# Patient Record
Sex: MALE | Race: WHITE | Employment: OTHER | RURAL
[De-identification: names, ages, dates, MRNs, and addresses within clinical notes are randomized per-mention and may not be internally consistent; named-entity substitution may affect disease eponyms.]

---

## 2017-01-03 ENCOUNTER — TELEPHONE ANTICOAG (OUTPATIENT)
Dept: FAMILY MEDICINE CLINIC | Age: 72
End: 2017-01-03

## 2017-01-03 ENCOUNTER — CLINICAL SUPPORT (OUTPATIENT)
Dept: FAMILY MEDICINE CLINIC | Age: 72
End: 2017-01-03

## 2017-01-03 DIAGNOSIS — I48.91 ATRIAL FIBRILLATION, UNSPECIFIED TYPE (HCC): Primary | ICD-10-CM

## 2017-01-03 DIAGNOSIS — Z79.01 CURRENT USE OF LONG TERM ANTICOAGULATION: ICD-10-CM

## 2017-01-03 LAB
INR BLD: 2.6
PT POC: 30.9 SEC
VALID INTERNAL CONTROL?: YES

## 2017-01-03 NOTE — MR AVS SNAPSHOT
Visit Information Date & Time Provider Department Dept. Phone Encounter #  
 1/3/2017  7:30 AM [facility] [address] [facility] [phone] [id number] Your Appointments 2/3/2017  8:00 AM  
ESTABLISHED PATIENT with HARRIET Jaureguicharli Tucker ([address]) Appt Note: ov; [address] [address]  
3021 [facility] [address] [address] Upcoming Health Maintenance Date Due Hepatitis C Screening 1945 DTaP/Tdap/Td series (1 - Tdap) 4/4/1968 ZOSTER VACCINE AGE 60> 4/4/2007 GLAUCOMA SCREENING Q2Y 4/4/2012 Pneumococcal 65+ Low/Medium Risk (1 of 2 - PCV13) 4/4/2012 INFLUENZA AGE 9 TO ADULT 8/1/2016 FOBT Q 1 YEAR AGE 50-75 1/29/2017 MEDICARE YEARLY EXAM 1/29/2017 Allergies as of 1/3/2017  Review Complete On: 10/7/2016 By: Christine Lunsford RN Severity Noted Reaction Type Reactions Morphine  05/24/2013    Unknown (comments) Current Immunizations  Never Reviewed No immunizations on file. Not reviewed this visit You Were Diagnosed With   
  
 Codes Comments Atrial fibrillation, unspecified type ([facility]    -  Primary ICD-10-CM: I48.91 
ICD-9-CM: 427.31 Current use of long term anticoagulation     ICD-10-CM: Z79.01 
ICD-9-CM: V58.61 Vitals Smoking Status Former Smoker Preferred Pharmacy Pharmacy Name Phone [place] [phone] [facility] [address] [place] [address] [phone] Your Updated Medication List  
  
   
This list is accurate as of: 1/3/17  7:46 AM.  Always use your most recent med list.  
  
  
  
  
 aspirin 81 mg tablet Take 81 mg by mouth. atorvastatin 80 mg tablet Commonly known as:  LIPITOR  
TAKE 1 TABLET EVERY NIGHT  
  
 fosinopril 20 mg tablet Commonly known as:  MONOPRIL Take 20 mg by mouth daily. LIDODERM 5 % Generic drug:  lidocaine  
by TransDERmal route every twenty-four (24) hours. Apply patch to the affected area for 12 hours a day and remove for 12 hours a day. meloxicam 7.5 mg tablet Commonly known as:  MOBIC  
TAKE 1 TABLET EVERY DAY AS NEEDED FOR SEVERE JOINT PAIN  
  
 MSM PO Take  by mouth.  
  
 multivitamin tablet Commonly known as:  ONE A DAY Take 1 tablet by mouth daily. warfarin 5 mg tablet Commonly known as:  COUMADIN Take 1 Tab by mouth daily. Dx:  678.08 Introducing \[facility]\"" & [facility] SERVICES! Dear Melina Mccracken: Thank you for requesting a Footway account. Our records indicate that you already have an active Footway account. You can access your account anytime at [email or web address]. [email or web address] Did you know that you can access your hospital and ER discharge instructions at any time in Footway? You can also review all of your test results from your hospital stay or ER visit. Additional Information If you have questions, please visit the Frequently Asked Questions section of the Footway website at [email or web address]/. Remember, Footway is NOT to be used for urgent needs. For medical emergencies, dial 911. Now available from your iPhone and Android! Please provide this summary of care documentation to your next provider. Your primary care clinician is listed as Adore Gracia. If you have any questions after today's visit, please call [phone].

## 2017-02-03 ENCOUNTER — OFFICE VISIT (OUTPATIENT)
Dept: FAMILY MEDICINE CLINIC | Age: 72
End: 2017-02-03

## 2017-02-03 VITALS
HEIGHT: 71 IN | TEMPERATURE: 97.3 F | SYSTOLIC BLOOD PRESSURE: 138 MMHG | DIASTOLIC BLOOD PRESSURE: 78 MMHG | WEIGHT: 305 LBS | HEART RATE: 59 BPM | RESPIRATION RATE: 20 BRPM | BODY MASS INDEX: 42.7 KG/M2 | OXYGEN SATURATION: 97 %

## 2017-02-03 DIAGNOSIS — I10 ESSENTIAL HYPERTENSION: Primary | ICD-10-CM

## 2017-02-03 DIAGNOSIS — I25.10 CORONARY ARTERY DISEASE INVOLVING NATIVE CORONARY ARTERY OF NATIVE HEART WITHOUT ANGINA PECTORIS: ICD-10-CM

## 2017-02-03 DIAGNOSIS — M25.561 CHRONIC PAIN OF BOTH KNEES: ICD-10-CM

## 2017-02-03 DIAGNOSIS — Z79.01 CURRENT USE OF LONG TERM ANTICOAGULATION: ICD-10-CM

## 2017-02-03 DIAGNOSIS — G89.29 CHRONIC PAIN OF BOTH KNEES: ICD-10-CM

## 2017-02-03 DIAGNOSIS — Z00.00 HEALTH CARE MAINTENANCE: ICD-10-CM

## 2017-02-03 DIAGNOSIS — M25.562 CHRONIC PAIN OF BOTH KNEES: ICD-10-CM

## 2017-02-03 DIAGNOSIS — E78.5 HYPERLIPIDEMIA, UNSPECIFIED HYPERLIPIDEMIA TYPE: ICD-10-CM

## 2017-02-03 LAB
INR BLD: 2.4
PT POC: 26.4 SEC
VALID INTERNAL CONTROL?: YES

## 2017-02-03 NOTE — MR AVS SNAPSHOT
Visit Information Date & Time Provider Department Dept. Phone Encounter #  
 2/3/2017  8:00 AM Jose Schultz NP [facility] Maxi Neal [address] [facility] [phone] [id number] Your Appointments 3/3/2017  7:30 AM  
SURGERY with Jose Schultz NP  
[address] ([facility]) Appt Note: PT; Skin tags/needs P.T./INR  
 [address] [address]  
3027 [facility] [address] [address] Upcoming Health Maintenance Date Due Hepatitis C Screening 1945 GLAUCOMA SCREENING Q2Y 4/4/2012 MEDICARE YEARLY EXAM 1/29/2017 FOBT Q 1 YEAR AGE 50-75 1/29/2017 Pneumococcal 65+ Low/Medium Risk (2 of 2 - PPSV23) 2/3/2018 DTaP/Tdap/Td series (2 - Td) 2/3/2027 Allergies as of 2/3/2017  Review Complete On: 2/3/2017 By: Jose Schultz NP Severity Noted Reaction Type Reactions Morphine  05/24/2013    Unknown (comments) Current Immunizations  Never Reviewed No immunizations on file. Not reviewed this visit You Were Diagnosed With   
  
 Codes Comments Essential hypertension    -  Primary ICD-10-CM: I10 
ICD-9-CM: 401.9 Hyperlipidemia, unspecified hyperlipidemia type     ICD-10-CM: E78.5 ICD-9-CM: 272.4 Coronary artery disease involving native coronary artery of native heart without angina pectoris     ICD-10-CM: I25.10 ICD-9-CM: 414.01 Chronic pain of both knees     ICD-10-CM: M25.561, M25.562, G89.29 ICD-9-CM: 719.46, 338.29 Health care maintenance     ICD-10-CM: Z00.00 ICD-9-CM: V70.0 Current use of long term anticoagulation     ICD-10-CM: Z79.01 
ICD-9-CM: V58.61 Vitals BP Pulse Temp Resp Height(growth percentile) 142/70 (BP 1 Location: Left arm, BP Patient Position: Sitting) (!) 59 97.3 °F (36.3 °C) (Temporal) 20 5' 10.5\" (1.791 m) Weight(growth percentile) SpO2 BMI Smoking Status 305 lb (138.3 kg) 97% 43.14 kg/m2 Former Smoker Vitals History BMI and BSA Data Body Mass Index Body Surface Area  
 43.14 kg/m 2 2.62 m 2 Preferred Pharmacy Pharmacy Name Phone [place], [facility] [address] [phone] Your Updated Medication List  
  
   
This list is accurate as of: 2/3/17  9:32 AM.  Always use your most recent med list.  
  
  
  
  
 aspirin 81 mg tablet Take 81 mg by mouth. atorvastatin 80 mg tablet Commonly known as:  LIPITOR  
TAKE 1 TABLET EVERY NIGHT  
  
 fosinopril 20 mg tablet Commonly known as:  MONOPRIL Take 20 mg by mouth daily. LIDODERM 5 % Generic drug:  lidocaine  
by TransDERmal route every twenty-four (24) hours. Apply patch to the affected area for 12 hours a day and remove for 12 hours a day. meloxicam 7.5 mg tablet Commonly known as:  MOBIC  
TAKE 1 TABLET EVERY DAY AS NEEDED FOR SEVERE JOINT PAIN  
  
 MSM PO Take  by mouth.  
  
 multivitamin tablet Commonly known as:  ONE A DAY Take 1 tablet by mouth daily. warfarin 5 mg tablet Commonly known as:  COUMADIN Take 1 Tab by mouth daily. Dx:  711.06 We Performed the Following AMB POC PT/INR [25370 CPT(R)] CBC WITH AUTOMATED DIFF [17692 CPT(R)] COLLECTION CAPILLARY BLOOD SPECIMEN [81371 CPT(R)] HEPATITIS C AB [39214 CPT(R)] LIPID PANEL [26360 CPT(R)] METABOLIC PANEL, COMPREHENSIVE [45549 CPT(R)] REFERRAL TO ORTHOPEDICS [EEM054 Custom] Comments:  
 Please evaluate patient for bilateral knee Referral Information Referral ID Referred By Referred To  
  
 [id number] [place], 114 [facility] [address] [place] [address]  Phone: [phone] Fax: [phone] Visits Status Start Date End Date 1 Authorized 2/3/17 2/3/18  If your referral has a status of pending review or denied, additional information will be sent to support the outcome of this decision. Introducing [facility] & HEALTH SERVICES! Dear Ross Diaz: Thank you for requesting a OneAway account. Our records indicate that you already have an active OneAway account. You can access your account anytime at [email or web address] Did you know that you can access your hospital and ER discharge instructions at any time in OneAway? You can also review all of your test results from your hospital stay or ER visit. Additional Information If you have questions, please visit the Frequently Asked Questions section of the OneAway website at [email or web address] Remember, OneAway is NOT to be used for urgent needs. For medical emergencies, dial 911. Now available from your iPhone and Android! Please provide this summary of care documentation to your next provider. Your primary care clinician is listed as Mary Long. If you have any questions after today's visit, please call [phone].

## 2017-02-03 NOTE — PROGRESS NOTES
Subjective:     Trini Zazueta is a 73 y.o. male who presents today with the following:  Chief Complaint   Patient presents with    Annual Wellness Visit     subsequent   HPI: compliant with medications and current plan for chronic medical conditions. Doing well today. HTN: Denies chest pain, dyspnea, palpitations, HA, blurred vision. Hyperlipidemia: tolerating statin, diet control      Bilateral knee pain. Rt Knee surgery 20 years ago. \" It feels like I have bone grinding on bone. \"     Lt Knee painful and stiff when sitting for along time in a chair. \" I use this knee first going down the stairs because my other knee hurts too much. \"    Takes Tylenol as needed for the pain. ROS:  Gen: denies fever, chills, fatigue, weight loss, weight gain  HEENT:denies blurry vision, nasal congestion, sore throat  Resp: denies dypsnea, cough, wheezing  CV: denies chest pain radiating to the jaws or arms, palpitations, lower extremity edema  Abd: denies nausea, vomiting, diarrhea, constipation  Neuro: denies numbness/tingling  Endo: denies polyuria, polydipsia, heat/cold intolerance  Heme: no lymphadenopathy    Allergies   Allergen Reactions    Morphine Unknown (comments)         Current Outpatient Prescriptions:     atorvastatin (LIPITOR) 80 mg tablet, TAKE 1 TABLET EVERY NIGHT, Disp: 90 Tab, Rfl: 2    meloxicam (MOBIC) 7.5 mg tablet, TAKE 1 TABLET EVERY DAY AS NEEDED FOR SEVERE JOINT PAIN, Disp: 90 Tab, Rfl: 0    METHYLSULFONYLMETHANE (MSM PO), Take  by mouth., Disp: , Rfl:     warfarin (COUMADIN) 5 mg tablet, Take 1 Tab by mouth daily. Dx:  427.31 (Patient taking differently: Take 5 mg by mouth daily. Pt takes 1 tab Sat.,sun., tues. , and thurs. None on other days), Disp: 90 Tab, Rfl: 2    multivitamin (ONE A DAY) tablet, Take 1 tablet by mouth daily. , Disp: , Rfl:     lidocaine (LIDODERM) 5 %(700 mg/patch), by TransDERmal route every twenty-four (24) hours.  Apply patch to the affected area for 12 hours a day and remove for 12 hours a day., Disp: , Rfl:     aspirin 81 mg tablet, Take 81 mg by mouth., Disp: , Rfl:     fosinopril (MONOPRIL) 20 mg tablet, Take 20 mg by mouth daily. , Disp: , Rfl:     Past Medical History   Diagnosis Date    [facility]     Arthritis     Atrial fibrillation ([facility]     Congestive heart failure, unspecified      mild    Edema     Hypercholesterolemia     Hypertension     Unspecified vitamin D deficiency        Past Surgical History   Procedure Laterality Date    Hx knee arthroscopy Right     Hx angioplasty      Hx tonsillectomy         History   Smoking Status    Former Smoker   Smokeless Tobacco    Not on file       Social History     Social History    Marital status:      Spouse name: N/A    Number of children: N/A    Years of education: N/A     Social History Main Topics    Smoking status: Former Smoker    Smokeless tobacco: None    Alcohol use No    Drug use: None    Sexual activity: Not Asked     Other Topics Concern     Service Yes    Blood Transfusions No    Caffeine Concern No    Occupational Exposure No    Hobby Hazards Yes     fishing    Sleep Concern No    Stress Concern No    Weight Concern Yes     over weight    Special Diet No    Back Care Yes     pain    Exercise Yes    Bike Helmet Yes    Seat Belt Yes    Self-Exams Yes     Social History Narrative       Family History   Problem Relation Age of Onset    Lung Disease Mother     Cancer Mother      Lung--smoker    Heart Disease Father     Cancer Father      Prostate    No Known Problems Sister          Objective:     Visit Vitals    /70 (BP 1 Location: Left arm, BP Patient Position: Sitting)    Pulse (!) 59    Temp 97.3 °F (36.3 °C) (Temporal)    Resp 20    Ht 5' 10.5\" (1.791 m)    Wt 305 lb (138.3 kg)    SpO2 97%    BMI 43.14 kg/m2     Body mass index is 43.14 kg/(m^2). General: Alert and oriented. No acute distress.   Well nourished  HEENT :  Ears:TMs are normal. Canals are clear. Eyes: pupils equal, round, react to light and accommodation. Extra ocular movements intact. Nose: patent. Mouth and throat is clear. Neck:supple full range of motion no thyromegaly. Trachea midline, No carotid bruits. No significant lymphadenopathy  Lungs[de-identified] clear to auscultation without wheezes, rales, or rhonchi. Heart :RRR, S1 & S2 are normal intensity. No murmur; no gallop  Abdomen: bowel sounds active. No tenderness, guarding, rebound, masses, hepatic or spleen enlargement  Back: no CVA tenderness. Extremities: without clubbing, cyanosis, or edema  Pulses: radial and femoral pulses are normal  Neuro: HMF intact. Cranial nerves II through XII grossly normal.  Motor: is 5 over 5 and symmetrical.   Motor: Ambulation: Heel toe gait swagger. Knee: Rt knee tenderness to palpation + David   Left Knee: tender to palpation proximal to the patellar mildly edematous proximal to patella. Deep tendon reflexes: +2 right. +1 on the left. Deep tendon reflexes: +2 equal  Results for orders placed or performed in visit on 01/03/17   AMB POC PT/INR   Result Value Ref Range    VALID INTERNAL CONTROL POC Yes     Prothrombin time (POC) 30.9 sec    INR POC 2.6        No results found for this visit on 02/03/17. Assessment/ Plan:     HTN: Continue current plan    Hyperlipidemia: Continue current plan, Lipid profile     CAD: continue current plan    Long term use anti coag fr A fib: continue current plan      Verbal and written instructions (see AVS) provided.  Patient expresses understanding of diagnosis and treatment plan. IVAN Guerra                      Anticoagulation. Dx:  Atrial fibrillation.    Current symptoms: none  Current control agent: none  Current anticoagulant: warfarin 5 mg Sat , Sunday, tuesday and Thursday  History of bleeding problems: none  Lab Results   Component Value Date/Time    INR POC 2.4 02/03/2017 09:03 AM    INR POC 2.6 01/03/2017 07:40 AM    INR POC 1.9 12/02/2016 07:33 AM                                   Trini Zazueta is a 73 y.o. male and presents for annual Medicare Wellness Visit. Problem List: Reviewed with patient and discussed risk factors. Patient Active Problem List   Diagnosis Code    CAD (coronary artery disease) I25.10    Atrial fibrillation ([facility] I48.91    Hyperlipidemia E78.5    Hypertension I10    Congestive heart failure (HCC) I50.9    Current use of long term anticoagulation Z79.01       Current medical providers:  Patient Care Team:  Irlanda Smith NP as PCP - General (Nurse Practitioner)    PSH: Reviewed with patient  Past Surgical History   Procedure Laterality Date    Hx knee arthroscopy Right     Hx angioplasty      Hx tonsillectomy          SH: Reviewed with patient  Social History   Substance Use Topics    Smoking status: Former Smoker    Smokeless tobacco: None    Alcohol use No       FH: Reviewed with patient  Family History   Problem Relation Age of Onset    Lung Disease Mother     Cancer Mother      Lung--smoker    Heart Disease Father     Cancer Father      Prostate    No Known Problems Sister        Medications/Allergies: Reviewed with patient  Current Outpatient Prescriptions on File Prior to Visit   Medication Sig Dispense Refill    atorvastatin (LIPITOR) 80 mg tablet TAKE 1 TABLET EVERY NIGHT 90 Tab 2    meloxicam (MOBIC) 7.5 mg tablet TAKE 1 TABLET EVERY DAY AS NEEDED FOR SEVERE JOINT PAIN 90 Tab 0    METHYLSULFONYLMETHANE (MSM PO) Take  by mouth.  warfarin (COUMADIN) 5 mg tablet Take 1 Tab by mouth daily. Dx:  427.31 (Patient taking differently: Take 5 mg by mouth daily. Pt takes 1 tab Sat.,sun., tues. , and thurs. None on other days) 90 Tab 2    multivitamin (ONE A DAY) tablet Take 1 tablet by mouth daily.  lidocaine (LIDODERM) 5 %(700 mg/patch) by TransDERmal route every twenty-four (24) hours. Apply patch to the affected area for 12 hours a day and remove for 12 hours a day.       aspirin 81 mg tablet Take 81 mg by mouth.  fosinopril (MONOPRIL) 20 mg tablet Take 20 mg by mouth daily. No current facility-administered medications on file prior to visit. Allergies   Allergen Reactions    Morphine Unknown (comments)       Objective:  Visit Vitals    /70 (BP 1 Location: Left arm, BP Patient Position: Sitting)    Pulse (!) 59    Temp 97.3 °F (36.3 °C) (Temporal)    Resp 20    Ht 5' 10.5\" (1.791 m)    Wt 305 lb (138.3 kg)    SpO2 97%    BMI 43.14 kg/m2    Body mass index is 43.14 kg/(m^2). Assessment of cognitive impairment: Alert and oriented x 3    Depression Screen:   PHQ 2 / 9, over the last two weeks 2/3/2017   Little interest or pleasure in doing things Not at all   Feeling down, depressed or hopeless Not at all   Total Score PHQ 2 0       Fall Risk Assessment:    Fall Risk Assessment, last 12 mths 2/3/2017   Able to walk? Yes   Fall in past 12 months? No       Functional Ability:   Does the patient exhibit a steady gait? yes   How long did it take the patient to get up and walk from a sitting position? 2 seconds   Is the patient self reliant?  (ie can do own laundry, meals, household chores)  yes     Does the patient handle his/her own medications? yes     Does the patient handle his/her own money? yes     Is the patients home safe (ie good lighting, handrails on stairs and bath, etc.)? yes     Did you notice or did patient express any hearing difficulties? no     Did you notice or did patient express any vision difficulties?   no     Were distance and reading eye charts used? no       Advance Care Planning:   Patient was offered the opportunity to discuss advance care planning:  yes     Does patient have an Advance Directive:  yes   If no, did you provide information on Caring Connections?  no       Plan:      No orders of the defined types were placed in this encounter.       Health Maintenance   Topic Date Due    Hepatitis C Screening 1945    GLAUCOMA SCREENING Q2Y  04/04/2012    MEDICARE YEARLY EXAM  01/29/2017    FOBT Q 1 YEAR AGE 50-75  01/29/2017    Pneumococcal 65+ Low/Medium Risk (2 of 2 - PPSV23) 02/03/2018    DTaP/Tdap/Td series (2 - Td) 02/03/2027    ZOSTER VACCINE AGE 60>  Addressed    INFLUENZA AGE 9 TO ADULT  Addressed       *Patient verbalized understanding and agreement with the plan. A copy of the After Visit Summary with personalized health plan was given to the patient today.

## 2017-02-04 LAB
ALBUMIN SERPL-MCNC: 4.1 G/DL (ref 3.6–4.8)
ALBUMIN/GLOB SERPL: 1.8 {RATIO} (ref 1.1–2.5)
ALP SERPL-CCNC: 135 IU/L (ref 39–117)
ALT SERPL-CCNC: 25 IU/L (ref 0–44)
AST SERPL-CCNC: 26 IU/L (ref 0–40)
BASOPHILS # BLD AUTO: 0 X10E3/UL (ref 0–0.2)
BASOPHILS NFR BLD AUTO: 0 %
BILIRUB SERPL-MCNC: 1.1 MG/DL (ref 0–1.2)
BUN SERPL-MCNC: 20 MG/DL (ref 8–27)
BUN/CREAT SERPL: 24 (ref 10–22)
CALCIUM SERPL-MCNC: 8.8 MG/DL (ref 8.6–10.2)
CHLORIDE SERPL-SCNC: 104 MMOL/L (ref 96–106)
CHOLEST SERPL-MCNC: 143 MG/DL (ref 100–199)
CO2 SERPL-SCNC: 24 MMOL/L (ref 18–29)
CREAT SERPL-MCNC: 0.85 MG/DL (ref 0.76–1.27)
EOSINOPHIL # BLD AUTO: 0.1 X10E3/UL (ref 0–0.4)
EOSINOPHIL NFR BLD AUTO: 2 %
ERYTHROCYTE [DISTWIDTH] IN BLOOD BY AUTOMATED COUNT: 13.5 % (ref 12.3–15.4)
GLOBULIN SER CALC-MCNC: 2.3 G/DL (ref 1.5–4.5)
GLUCOSE SERPL-MCNC: 101 MG/DL (ref 65–99)
HCT VFR BLD AUTO: 48.5 % (ref 37.5–51)
HCV AB S/CO SERPL IA: <0.1 S/CO RATIO (ref 0–0.9)
HDLC SERPL-MCNC: 45 MG/DL
HGB BLD-MCNC: 17 G/DL (ref 12.6–17.7)
IMM GRANULOCYTES # BLD: 0 X10E3/UL (ref 0–0.1)
IMM GRANULOCYTES NFR BLD: 0 %
LDLC SERPL CALC-MCNC: 83 MG/DL (ref 0–99)
LYMPHOCYTES # BLD AUTO: 1.1 X10E3/UL (ref 0.7–3.1)
LYMPHOCYTES NFR BLD AUTO: 16 %
MCH RBC QN AUTO: 32.3 PG (ref 26.6–33)
MCHC RBC AUTO-ENTMCNC: 35.1 G/DL (ref 31.5–35.7)
MCV RBC AUTO: 92 FL (ref 79–97)
MONOCYTES # BLD AUTO: 0.9 X10E3/UL (ref 0.1–0.9)
MONOCYTES NFR BLD AUTO: 13 %
NEUTROPHILS # BLD AUTO: 4.6 X10E3/UL (ref 1.4–7)
NEUTROPHILS NFR BLD AUTO: 69 %
PLATELET # BLD AUTO: 176 X10E3/UL (ref 150–379)
POTASSIUM SERPL-SCNC: 4.8 MMOL/L (ref 3.5–5.2)
PROT SERPL-MCNC: 6.4 G/DL (ref 6–8.5)
RBC # BLD AUTO: 5.27 X10E6/UL (ref 4.14–5.8)
SODIUM SERPL-SCNC: 142 MMOL/L (ref 134–144)
TRIGL SERPL-MCNC: 76 MG/DL (ref 0–149)
VLDLC SERPL CALC-MCNC: 15 MG/DL (ref 5–40)
WBC # BLD AUTO: 6.7 X10E3/UL (ref 3.4–10.8)

## 2017-02-06 NOTE — PROGRESS NOTES
CBC normal  Lipid panel normal  Negative Hep C   Metabolic Panel improving (BUN creat ratio, glucose and liver function improving).

## 2017-02-14 RX ORDER — WARFARIN SODIUM 5 MG/1
5 TABLET ORAL DAILY
Qty: 90 TAB | Refills: 2 | Status: CANCELLED | OUTPATIENT
Start: 2017-02-14

## 2017-02-14 RX ORDER — WARFARIN SODIUM 5 MG/1
5 TABLET ORAL DAILY
Qty: 90 TAB | Refills: 2 | Status: SHIPPED | OUTPATIENT
Start: 2017-02-14 | End: 2018-04-20 | Stop reason: SDUPTHER

## 2017-02-17 RX ORDER — MELOXICAM 7.5 MG/1
TABLET ORAL
Qty: 90 TAB | Refills: 0 | Status: SHIPPED | OUTPATIENT
Start: 2017-02-17 | End: 2017-04-24 | Stop reason: SDUPTHER

## 2017-03-03 ENCOUNTER — OFFICE VISIT (OUTPATIENT)
Dept: FAMILY MEDICINE CLINIC | Age: 72
End: 2017-03-03

## 2017-03-03 VITALS
HEIGHT: 71 IN | OXYGEN SATURATION: 97 % | BODY MASS INDEX: 41.61 KG/M2 | HEART RATE: 70 BPM | TEMPERATURE: 97.4 F | DIASTOLIC BLOOD PRESSURE: 60 MMHG | WEIGHT: 297.2 LBS | RESPIRATION RATE: 20 BRPM | SYSTOLIC BLOOD PRESSURE: 102 MMHG

## 2017-03-03 DIAGNOSIS — L91.8 MULTIPLE ACQUIRED SKIN TAGS: ICD-10-CM

## 2017-03-03 DIAGNOSIS — I48.91 ATRIAL FIBRILLATION, UNSPECIFIED TYPE (HCC): Primary | ICD-10-CM

## 2017-03-03 DIAGNOSIS — L82.0 INFLAMED SEBORRHEIC KERATOSIS: ICD-10-CM

## 2017-03-03 LAB
INR BLD: 2.1
PT POC: 25.2 SEC
VALID INTERNAL CONTROL?: YES

## 2017-03-03 NOTE — MR AVS SNAPSHOT
Visit Information Date & Time Provider Department Dept. Phone Encounter #  
 3/3/2017  7:30 AM iVta Rosen NP [facility] 13[address] [facility] [phone] [id number] Your Appointments 4/3/2017  7:30 AM  
ESTABLISHED PATIENT with Vita Rosen NP  
Trent Tucker ([address]) Appt Note: pt  
 [address] [address]  
[address] [facility] [address] [address] Upcoming Health Maintenance Date Due  
 GLAUCOMA SCREENING Q2Y 4/4/2012 FOBT Q 1 YEAR AGE 50-75 1/29/2017 Pneumococcal 65+ Low/Medium Risk (2 of 2 - PPSV23) 2/3/2018 MEDICARE YEARLY EXAM 2/4/2018 DTaP/Tdap/Td series (2 - Td) 2/3/2027 Allergies as of 3/3/2017  Review Complete On: 3/3/2017 By: Vita Rosen NP Severity Noted Reaction Type Reactions Morphine  05/24/2013    Unknown (comments) Current Immunizations  Never Reviewed No immunizations on file. Not reviewed this visit You Were Diagnosed With   
  
 Codes Comments Atrial fibrillation, unspecified type ([facility]    -  Primary ICD-10-CM: I48.91 
ICD-9-CM: 427.31   
 Multiple acquired skin tags     ICD-10-CM: L91.8 ICD-9-CM: 701.9 Vitals BP  
  
  
  
  
  
 102/60 (BP 1 Location: Left arm, BP Patient Position: Sitting) BMI and BSA Data Body Mass Index Body Surface Area 42.04 kg/m 2 2.59 m 2 Preferred Pharmacy Pharmacy Name Phone [address] [place] [address] [phone] Your Updated Medication List  
  
   
This list is accurate as of: 3/3/17  8:23 AM.  Always use your most recent med list.  
  
  
  
  
 aspirin 81 mg tablet Take 81 mg by mouth. atorvastatin 80 mg tablet Commonly known as:  LIPITOR  
TAKE 1 TABLET EVERY NIGHT  
  
 fosinopril 20 mg tablet Commonly known as:  MONOPRIL Take 20 mg by mouth daily. LIDODERM 5 % Generic drug:  lidocaine  
by TransDERmal route every twenty-four (24) hours. Apply patch to the affected area for 12 hours a day and remove for 12 hours a day. meloxicam 7.5 mg tablet Commonly known as:  MOBIC  
TAKE 1 TABLET EVERY DAY AS NEEDED FOR SEVERE JOINT PAIN  
  
 MSM PO Take  by mouth.  
  
 multivitamin tablet Commonly known as:  ONE A DAY Take 1 tablet by mouth daily. warfarin 5 mg tablet Commonly known as:  COUMADIN Take 1 Tab by mouth daily. Pt takes 1 tab Sat.,sun., tues. , and thurs. None on other days We Performed the Following AMB POC PT/INR [12077 CPT(R)] COLLECTION CAPILLARY BLOOD SPECIMEN [35773 CPT(R)] Introducing [facility] & [facility] SERVICES! Dear Karyn Kessler: Thank you for requesting a [employer] account. Our records indicate that you already have an active DealTraction account. You can access your account anytime at [email or web address]. [email or web address] Did you know that you can access your hospital and ER discharge instructions at any time in [employer]? You can also review all of your test results from your hospital stay or ER visit. Additional Information If you have questions, please visit the Frequently Asked Questions section of the DealTraction website at [email or web address]/. Remember, DealTraction is NOT to be used for urgent needs. For medical emergencies, dial 911. Now available from your iPhone and Android! Please provide this summary of care documentation to your next provider. Your primary care clinician is listed as Khushi Youssef. If you have any questions after today's visit, please call [phone].

## 2017-03-03 NOTE — PATIENT INSTRUCTIONS
Skin Lesion Removal: What to Expect at Home  Your Recovery  After your procedure, you should not have much pain. But some soreness, swelling, or bruising is normal. Your doctor may recommend over-the-counter medicines to help with any discomfort. Most people can return to their normal routine the same day of their procedure. How quickly your wound heals depends on the size of your wound and the type of procedure you had. Most wounds take 1 to 3 weeks to heal. If you had laser surgery, your skin may change color and then slowly return to its normal color. You may need only a bandage, or you may need stitches. If you had stitches, your doctor will probably remove them 5 to 14 days later. If you have the type of stitches that dissolve, they do not have to be removed. They will disappear on their own. This care sheet gives you a general idea about how long it will take for you to recover. But each person recovers at a different pace. Follow the steps below to get better as quickly as possible. How can you care for yourself at home? Activity  · For the first few days, try not to bump or knock your wound. · Depending on where your wound is, you may need to avoid strenuous exercise for 2 weeks after the procedure or until your doctor says it is okay. · If you have had a lesion removed from your face, do not use makeup near your wound until you have your stitches taken out. · Ask your doctor when it is okay to shower, bathe, or swim. Medicines  · Your doctor will tell you if and when you can restart your medicines. He or she will also give you instructions about taking any new medicines. · If you take blood thinners, such as warfarin (Coumadin), clopidogrel (Plavix), or aspirin, be sure to talk to your doctor. He or she will tell you if and when to start taking those medicines again. Make sure that you understand exactly what your doctor wants you to do. · Be safe with medicines.  Take pain medicines exactly as directed. ¨ If the doctor gave you a prescription medicine for pain, take it as prescribed. ¨ If you are not taking a prescription pain medicine, ask your doctor if you can take an over-the-counter medicine. Wound care  · If your doctor told you how to care for your incision, follow your doctor's instructions. If you did not get instructions, follow this general advice:  ¨ Keep the wound bandaged and dry for the first day. ¨ After the first 24 to 48 hours, wash around the wound with clean water 2 times a day. Don't use hydrogen peroxide or alcohol, which can slow healing. ¨ You may cover the wound with a thin layer of petroleum jelly, such as Vaseline, and a nonstick bandage. ¨ Apply more petroleum jelly and replace the bandage as needed. · If you have stitches, you may get other instructions. · If a scab forms, do not pull it off. Let it fall off on its own. Wounds heal faster if no scab forms. Washing the area every day and using petroleum jelly will help prevent a scab from forming. · If the wound bleeds, put direct pressure on it with a clean cloth until the bleeding stops. · If you had a growth \"frozen\" off, you may get a blister. Do not break it. Let it dry up on its own. It is common for the blister to fill with blood. You do not need to do anything about this, but if it becomes too painful, call your doctor. · Avoid the sun until your stitches are removed. Follow-up care is a key part of your treatment and safety. Be sure to make and go to all appointments, and call your doctor if you are having problems. It's also a good idea to know your test results and keep a list of the medicines you take. When should you call for help? Call 911 anytime you think you may need emergency care. For example, call if:  · You passed out (lost consciousness). · You have severe trouble breathing. · You have sudden chest pain and shortness of breath, or you cough up blood.   Call your doctor now or seek immediate medical care if:  · You have signs of infection, such as:  ¨ Increased pain, swelling, warmth, or redness. ¨ Red streaks leading from the wound. ¨ Pus draining from the wound. ¨ A fever. · You have pain that does not get better after you take pain medicine. · You have loose stitches. Watch closely for changes in your health, and be sure to contact your doctor if you have any problems. Where can you learn more? Go to http://tyler-thomas.info/. Enter Q228 in the search box to learn more about \"Skin Lesion Removal: What to Expect at Home. \"  Current as of: February 5, 2016  Content Version: 11.1  © 1849-4368 [employer]. Care instructions adapted under license by [employer] (which disclaims liability or warranty for this information). If you have questions about a medical condition or this instruction, always ask your healthcare professional. [address] any warranty or liability for your use of this information.

## 2017-03-03 NOTE — PROGRESS NOTES
Subjective:     Naomy Hunter is a 73 y.o. male who presents today with the following:  Chief Complaint   Patient presents with    Lesion     skin tag removal on neck    Anticoagulation   Here  for skin tag removal on neck and chest. No change in size , color ,or  symmetry  Discussed warning signs of skin caner . HM refused the FIT test  ROS:  Gen: denies fever, chills, fatigue, weight loss, weight gain  HEENT:denies blurry vision, nasal congestion, sore throat  Resp: denies dypsnea, cough, wheezing  CV: denies chest pain radiating to the jaws or arms, palpitations, lower extremity edema      Allergies   Allergen Reactions    Morphine Unknown (comments)         Current Outpatient Prescriptions:     meloxicam (MOBIC) 7.5 mg tablet, TAKE 1 TABLET EVERY DAY AS NEEDED FOR SEVERE JOINT PAIN, Disp: 90 Tab, Rfl: 0    warfarin (COUMADIN) 5 mg tablet, Take 1 Tab by mouth daily. Pt takes 1 tab Sat.,sun., tues. , and thurs. None on other days, Disp: 90 Tab, Rfl: 2    atorvastatin (LIPITOR) 80 mg tablet, TAKE 1 TABLET EVERY NIGHT, Disp: 90 Tab, Rfl: 2    METHYLSULFONYLMETHANE (MSM PO), Take  by mouth., Disp: , Rfl:     multivitamin (ONE A DAY) tablet, Take 1 tablet by mouth daily. , Disp: , Rfl:     lidocaine (LIDODERM) 5 %(700 mg/patch), by TransDERmal route every twenty-four (24) hours. Apply patch to the affected area for 12 hours a day and remove for 12 hours a day., Disp: , Rfl:     aspirin 81 mg tablet, Take 81 mg by mouth., Disp: , Rfl:     fosinopril (MONOPRIL) 20 mg tablet, Take 20 mg by mouth daily. , Disp: , Rfl:     Past Medical History:   Diagnosis Date    Acute MI ([address]     Arthritis     [facility]     Congestive heart failure, unspecified     mild    Edema     Hypercholesterolemia     Hypertension     Unspecified vitamin D deficiency        Past Surgical History:   Procedure Laterality Date    HX ANGIOPLASTY      HX KNEE ARTHROSCOPY Right     HX TONSILLECTOMY History   Smoking Status    Former Smoker   Smokeless Tobacco    Not on file       Social History     Social History    Marital status:      Spouse name: N/A    Number of children: N/A    Years of education: N/A     Social History Main Topics    Smoking status: Former Smoker    Smokeless tobacco: None    Alcohol use No    Drug use: None    Sexual activity: Not Asked     Other Topics Concern     Service Yes    Blood Transfusions No    Caffeine Concern No    Occupational Exposure No    Hobby Hazards Yes     fishing    Sleep Concern No    Stress Concern No    Weight Concern Yes     over weight    Special Diet No    Back Care Yes     pain    Exercise Yes    Bike Helmet Yes    Seat Belt Yes    Self-Exams Yes     Social History Narrative       Family History   Problem Relation Age of Onset    Lung Disease Mother     Cancer Mother      Lung--smoker    Heart Disease Father     Cancer Father      Prostate    No Known Problems Sister          Objective:     Visit Vitals    /60 (BP 1 Location: Left arm, BP Patient Position: Sitting)    Pulse 70    Temp 97.4 °F (36.3 °C) (Temporal)    Resp 20    Ht 5' 10.5\" (1.791 m)    Wt 297 lb 3.2 oz (134.8 kg)    SpO2 97%    BMI 42.04 kg/m2     Body mass index is 42.04 kg/(m^2). General: Alert and oriented. No acute distress. Well nourished  Neck:supple full range of motion no thyromegaly. Trachea midline, No carotid bruits. No significant lymphadenopathy  Lungs[de-identified] clear to auscultation without wheezes, rales, or rhonchi. Heart :RRR, S1 & S2 are normal intensity.  No murmur; no gallop      Results for orders placed or performed in visit on 03/03/17   AMB POC PT/INR   Result Value Ref Range    VALID INTERNAL CONTROL POC Yes     Prothrombin time (POC) 25.2 sec    INR POC 2.1        Results for orders placed or performed in visit on 03/03/17   AMB POC PT/INR   Result Value Ref Range    VALID INTERNAL CONTROL POC Yes Prothrombin time (POC) 25.2 sec    INR POC 2.1        Assessment/ Plan:     Corey Lassiter was seen today for lesion and anticoagulation. Diagnoses and all orders for this visit:    Atrial fibrillation, unspecified type ([facility] [facility]  -     COLLECTION CAPILLARY BLOOD SPECIMEN  -     AMB POC PT/INR    Multiple acquired skin tags  -     Cancel: DESTRUC BENIGN LESION, UP TO 14 LESIONS  -     DESTRUC BENIGN LESION, UP TO 14 LESIONS    Inflamed seborrheic keratosis   -     DESTRUC BENIGN LESION, UP TO 14 LESIONS         1. Atrial fibrillation, unspecified type ([facility] [facility]    2. Multiple acquired skin tags    3. Inflamed seborrheic keratosis         Orders Placed This Encounter    COLLECTION CAPILLARY BLOOD SPECIMEN    DESTRUC BENIGN LESION, UP TO 14 LESIONS    AMB POC PT/INR         Verbal and written instructions (see AVS) provided.  Patient expresses understanding of diagnosis and treatment plan. IVAN Shearer                                                    Anticoagulation. Dx:  Atrial fibrillation  Current symptoms: none  Current control agent: none  Current anticoagulant: warfarin 5mg on Sat,Sun, Tuesday and Thursdays. None on other days. . Takes Meloxicam 7.5 every day as needed for joint pain. History of bleeding problems: none  Lab Results   Component Value Date/Time    INR POC 2.1 03/03/2017 07:25 AM    INR POC 2.4 02/03/2017 09:03 AM    INR POC 2.6 01/03/2017 07:40 AM     PROCEDURE:   A timeout protocol was performed prior to initiating the procedure. The area was prepared and draped in the usual, sterile manner. The site was anesthetized with 1_% lidocaine and epinephrine . Five skin tags frozen appropriate fashion. . Bleeding was minimal. Post procedure care provided

## 2017-04-19 ENCOUNTER — OFFICE VISIT (OUTPATIENT)
Dept: FAMILY MEDICINE CLINIC | Age: 72
End: 2017-04-19

## 2017-04-19 VITALS
HEIGHT: 71 IN | TEMPERATURE: 97 F | HEART RATE: 80 BPM | BODY MASS INDEX: 39.2 KG/M2 | SYSTOLIC BLOOD PRESSURE: 110 MMHG | WEIGHT: 280 LBS | DIASTOLIC BLOOD PRESSURE: 70 MMHG | RESPIRATION RATE: 20 BRPM

## 2017-04-19 DIAGNOSIS — I48.91 ATRIAL FIBRILLATION, UNSPECIFIED TYPE (HCC): Primary | ICD-10-CM

## 2017-04-19 DIAGNOSIS — Z79.01 CURRENT USE OF LONG TERM ANTICOAGULATION: ICD-10-CM

## 2017-04-19 LAB
INR BLD: 2.7
PT POC: 32.1 SEC
VALID INTERNAL CONTROL?: YES

## 2017-04-19 NOTE — MR AVS SNAPSHOT
Visit Information Date & Time Provider Department Dept. Phone Encounter #  
 4/19/2017  7:30 AM Roc Mai NP [place] [id number] [facility] [phone] [id number] Follow-up Instructions Return in about 1 month (around 5/19/2017). Your Appointments 5/17/2017  7:30 AM  
ESTABLISHED PATIENT with Roc Mai NP  
[address] ([facility]) Appt Note: OV for P.T./INR; OV for P.T./INR  
 [address] [address]  
[address] [facility] [address] [address] Upcoming Health Maintenance Date Due  
 GLAUCOMA SCREENING Q2Y 4/4/2012 FOBT Q 1 YEAR AGE 50-75 1/29/2017 Pneumococcal 65+ Low/Medium Risk (2 of 2 - PPSV23) 2/3/2018 MEDICARE YEARLY EXAM 2/4/2018 DTaP/Tdap/Td series (2 - Td) 2/3/2027 Allergies as of 4/19/2017  Review Complete On: 4/19/2017 By: Roc Mai NP Severity Noted Reaction Type Reactions Morphine  05/24/2013    Unknown (comments) Current Immunizations  Never Reviewed No immunizations on file. Not reviewed this visit You Were Diagnosed With   
  
 Codes Comments Atrial fibrillation, unspecified type ([facility]    -  Primary ICD-10-CM: I48.91 
ICD-9-CM: 427.31 Current use of long term anticoagulation     ICD-10-CM: Z79.01 
ICD-9-CM: V58.61 Vitals BP Pulse Temp Resp Height(growth percentile) Weight(growth percentile) 110/70 (BP 1 Location: Left arm, BP Patient Position: Sitting) 80 97 °F (36.1 °C) (Temporal) 20 5' 10.5\" (1.791 m) 280 lb (127 kg) BMI Smoking Status 39.61 kg/m2 Former Smoker BMI and BSA Data Body Mass Index Body Surface Area  
 39.61 kg/m 2 2.51 m 2 Preferred Pharmacy Pharmacy Name Phone [address] [facility] [address] [place] [address] [phone] Your Updated Medication List  
  
   
 This list is accurate as of: 4/19/17  7:48 AM.  Always use your most recent med list.  
  
  
  
  
 aspirin 81 mg tablet Take 81 mg by mouth. atorvastatin 80 mg tablet Commonly known as:  LIPITOR  
TAKE 1 TABLET EVERY NIGHT  
  
 fosinopril 20 mg tablet Commonly known as:  MONOPRIL Take 20 mg by mouth daily. LIDODERM 5 % Generic drug:  lidocaine  
by TransDERmal route every twenty-four (24) hours. Apply patch to the affected area for 12 hours a day and remove for 12 hours a day. meloxicam 7.5 mg tablet Commonly known as:  MOBIC  
TAKE 1 TABLET EVERY DAY AS NEEDED FOR SEVERE JOINT PAIN  
  
 MSM PO Take  by mouth.  
  
 multivitamin tablet Commonly known as:  ONE A DAY Take 1 tablet by mouth daily. warfarin 5 mg tablet Commonly known as:  COUMADIN Take 1 Tab by mouth daily. Pt takes 1 tab Sat.,sun., tues. , and thurs. None on other days We Performed the Following AMB POC PT/INR [85996 CPT(R)] COLLECTION CAPILLARY BLOOD SPECIMEN [94778 CPT(R)] Follow-up Instructions Return in about 1 month (around 5/19/2017). Introducing [facility]! Deaoli Ewing Pill: Thank you for requesting a [employer] account. Our records indicate that you already have an active [employer] account. You can access your account anytime at [email or web address] Did you know that you can access your hospital and ER discharge instructions at any time in [employer]? You can also review all of your test results from your hospital stay or ER visit. Additional Information If you have questions, please visit the Frequently Asked Questions section of the [employer] website at [email or web address] Remember, [employer] is NOT to be used for urgent needs. For medical emergencies, dial 911. Now available from your iPhone and Android! Please provide this summary of care documentation to your next provider. Your primary care clinician is listed as Pastora Elaine. If you have any questions after today's visit, please call [phone].

## 2017-04-19 NOTE — PROGRESS NOTES
Subjective:     Elma Garcia is a 81 y.o. male who presents today with the following:  Chief Complaint   Patient presents with   Geoffrey Ericksoner is retired enjoys fishing. Has a [address] and enjoys pier fishing. Presents today for anticoagulation management. Allergies   Allergen Reactions    Morphine Unknown (comments)         Current Outpatient Prescriptions:     meloxicam (MOBIC) 7.5 mg tablet, TAKE 1 TABLET EVERY DAY AS NEEDED FOR SEVERE JOINT PAIN, Disp: 90 Tab, Rfl: 0    warfarin (COUMADIN) 5 mg tablet, Take 1 Tab by mouth daily. Pt takes 1 tab Sat.,sun., tues. , and thurs. None on other days, Disp: 90 Tab, Rfl: 2    atorvastatin (LIPITOR) 80 mg tablet, TAKE 1 TABLET EVERY NIGHT, Disp: 90 Tab, Rfl: 2    METHYLSULFONYLMETHANE (MSM PO), Take  by mouth., Disp: , Rfl:     multivitamin (ONE A DAY) tablet, Take 1 tablet by mouth daily. , Disp: , Rfl:     lidocaine (LIDODERM) 5 %(700 mg/patch), by TransDERmal route every twenty-four (24) hours. Apply patch to the affected area for 12 hours a day and remove for 12 hours a day., Disp: , Rfl:     aspirin 81 mg tablet, Take 81 mg by mouth., Disp: , Rfl:     fosinopril (MONOPRIL) 20 mg tablet, Take 20 mg by mouth daily. , Disp: , Rfl:     Past Medical History:   Diagnosis Date    Acute [facility] ([facility]     Arthritis     Atrial fibrillation (HCC)     Congestive heart failure, unspecified     mild    Edema     Hypercholesterolemia     Hypertension     Unspecified vitamin D deficiency        Past Surgical History:   Procedure Laterality Date    HX ANGIOPLASTY      HX KNEE ARTHROSCOPY Right     HX TONSILLECTOMY         History   Smoking Status    Former Smoker   Smokeless Tobacco    Not on file       Social History     Social History    Marital status:      Spouse name: N/A    Number of children: N/A    Years of education: N/A     Social History Main Topics    Smoking status: Former Smoker    Smokeless tobacco: None    Alcohol use No    Drug use: None    Sexual activity: Not Asked     Other Topics Concern     Service Yes    Blood Transfusions No    Caffeine Concern No    Occupational Exposure No    Hobby Hazards Yes     fishing    Sleep Concern No    Stress Concern No    Weight Concern Yes     over weight    Special Diet No    Back Care Yes     pain    Exercise Yes    Bike Helmet Yes    Seat Belt Yes    Self-Exams Yes     Social History Narrative       Family History   Problem Relation Age of Onset    Lung Disease Mother     Cancer Mother      Lung--smoker    Heart Disease Father     Cancer Father      Prostate    No Known Problems Sister          Objective:     Visit Vitals    /70 (BP 1 Location: Left arm, BP Patient Position: Sitting)    Pulse 80    Temp 97 °F (36.1 °C) (Temporal)    Resp 20    Ht 5' 10.5\" (1.791 m)    Wt 280 lb (127 kg)  Comment: refused    BMI 39.61 kg/m2     Body mass index is 39.61 kg/(m^2). General: Alert and oriented. No acute distress. Well nourished    Neck: Trachea midline, No carotid bruits. No significant lymphadenopathy  Lungs[de-identified] clear to auscultation without wheezes, rales, or rhonchi. Heart :RRR, S1 & S2 are normal intensity. No murmur; no gallop      Results for orders placed or performed in visit on 04/19/17   AMB POC PT/INR   Result Value Ref Range    VALID INTERNAL CONTROL POC Yes     Prothrombin time (POC) 32.1 sec    INR POC 2.7        Results for orders placed or performed in visit on 04/19/17   AMB POC PT/INR   Result Value Ref Range    VALID INTERNAL CONTROL POC Yes     Prothrombin time (POC) 32.1 sec    INR POC 2.7        Assessment/ Plan:     Lin Neri was seen today for anticoagulation.     Diagnoses and all orders for this visit:    Atrial fibrillation, unspecified type ([address]  -     COLLECTION CAPILLARY BLOOD SPECIMEN  -     AMB POC PT/INR    Current use of long term anticoagulation  -     COLLECTION CAPILLARY BLOOD SPECIMEN  -     AMB POC PT/INR         1. Atrial fibrillation, unspecified type ([facility] [facility]    2. Current use of long term anticoagulation        Orders Placed This Encounter    COLLECTION CAPILLARY BLOOD SPECIMEN    AMB POC PT/INR         Verbal and written instructions (see AVS) provided.  Patient expresses understanding of diagnosis and treatment plan. IVAN Nichols      Anticoagulation. Dx:  Atrial fibrillation. Current symptoms: none  Current control agent: none  Current anticoagulant: warfarin5 mg daily  History of bleeding problems: none  Lab Results   Component Value Date/Time    INR POC 2.7 04/19/2017 07:30 AM    INR POC 2.1 03/03/2017 07:25 AM    INR POC 2.4 02/03/2017 09:03 AM         RTO 1 month sooner as needed.    Archie GUADARRAMAC

## 2017-04-24 RX ORDER — MELOXICAM 7.5 MG/1
TABLET ORAL
Qty: 90 TAB | Refills: 0 | Status: SHIPPED | OUTPATIENT
Start: 2017-04-24 | End: 2017-07-11 | Stop reason: SDUPTHER

## 2017-05-17 ENCOUNTER — OFFICE VISIT (OUTPATIENT)
Dept: FAMILY MEDICINE CLINIC | Age: 72
End: 2017-05-17

## 2017-05-17 VITALS
DIASTOLIC BLOOD PRESSURE: 64 MMHG | HEIGHT: 71 IN | RESPIRATION RATE: 20 BRPM | HEART RATE: 82 BPM | TEMPERATURE: 97.8 F | SYSTOLIC BLOOD PRESSURE: 132 MMHG

## 2017-05-17 DIAGNOSIS — I48.91 ATRIAL FIBRILLATION, UNSPECIFIED TYPE (HCC): Primary | ICD-10-CM

## 2017-05-17 DIAGNOSIS — Z79.01 CURRENT USE OF LONG TERM ANTICOAGULATION: ICD-10-CM

## 2017-05-17 LAB
INR BLD: 2.5
PT POC: 29.6 SEC
VALID INTERNAL CONTROL?: YES

## 2017-05-17 NOTE — MR AVS SNAPSHOT
Visit Information Date & Time Provider Department Dept. Phone Encounter #  
 5/17/2017  7:30 AM Sofie Snellen, NP [facility] [address] [facility] [phone] [id number] Your Appointments 6/26/2017  7:30 AM  
ESTABLISHED PATIENT with Sofie Snellen, NP  
Trent Tucker ([address]) Appt Note: PT  
 [address] [address]  
[address] [facility] [address] [address] Upcoming Health Maintenance Date Due  
 GLAUCOMA SCREENING Q2Y 4/4/2012 FOBT Q 1 YEAR AGE 50-75 1/29/2017 INFLUENZA AGE 9 TO ADULT 8/1/2017 Pneumococcal 65+ Low/Medium Risk (2 of 2 - PPSV23) 2/3/2018 MEDICARE YEARLY EXAM 2/4/2018 DTaP/Tdap/Td series (2 - Td) 2/3/2027 Allergies as of 5/17/2017  Review Complete On: 5/17/2017 By: Phil King RN Severity Noted Reaction Type Reactions Morphine  05/24/2013    Unknown (comments) Current Immunizations  Never Reviewed No immunizations on file. Not reviewed this visit You Were Diagnosed With   
  
 Codes Comments Atrial fibrillation, unspecified type ([facility]    -  Primary ICD-10-CM: I48.91 
ICD-9-CM: 427.31 Current use of long term anticoagulation     ICD-10-CM: Z79.01 
ICD-9-CM: V58.61 Vitals BP Pulse Temp Resp Height(growth percentile) Smoking Status 132/64 (BP 1 Location: Left arm, BP Patient Position: Sitting) 82 97.8 °F (36.6 °C) (Temporal) 20 5' 10.5\" (1.791 m) Former Smoker Preferred Pharmacy Pharmacy Name Phone [address] [place] [address] [phone] Your Updated Medication List  
  
   
This list is accurate as of: 5/17/17  7:47 AM.  Always use your most recent med list.  
  
  
  
  
 aspirin 81 mg tablet Take 81 mg by mouth. atorvastatin 80 mg tablet Commonly known as:  LIPITOR  
TAKE 1 TABLET EVERY NIGHT fosinopril 20 mg tablet Commonly known as:  MONOPRIL Take 20 mg by mouth daily. LIDODERM 5 % Generic drug:  lidocaine  
by TransDERmal route every twenty-four (24) hours. Apply patch to the affected area for 12 hours a day and remove for 12 hours a day. meloxicam 7.5 mg tablet Commonly known as:  MOBIC  
TAKE 1 TABLET EVERY DAY AS NEEDED FOR SEVERE JOINT PAIN  
  
 MSM PO Take  by mouth.  
  
 multivitamin tablet Commonly known as:  ONE A DAY Take 1 tablet by mouth daily. warfarin 5 mg tablet Commonly known as:  COUMADIN Take 1 Tab by mouth daily. Pt takes 1 tab Sat.,sun., tues. , and thurs. None on other days We Performed the Following AMB POC PT/INR [55744 CPT(R)] COLLECTION CAPILLARY BLOOD SPECIMEN [18788 CPT(R)] Introducing [facility]! Dear Mandeep Gallo: Thank you for requesting a [employer] account. Our records indicate that you already have an active [employer] account. You can access your account anytime at [email or web address] Did you know that you can access your hospital and ER discharge instructions at any time in [employer]? You can also review all of your test results from your hospital stay or ER visit. Additional Information If you have questions, please visit the Frequently Asked Questions section of the [employer] website at [email or web address] Remember, [employer] is NOT to be used for urgent needs. For medical emergencies, dial 911. Now available from your iPhone and Android! Please provide this summary of care documentation to your next provider. Your primary care clinician is listed as Jae Marquez. If you have any questions after today's visit, please call [phone].

## 2017-05-23 NOTE — PROGRESS NOTES
Paramjit Steen presents for evaluation and management of long term anticoagulation therapy. Anticoagulation. Dx: Atrial fibrillation.   Current symptoms: none  Current control agent: B-blocker  Current anticoagulant: warfarin5 mgs daily  History of bleeding problems: none  Lab Results   Component Value Date/Time    INR POC 2.5 05/17/2017 07:25 AM    INR POC 2.7 04/19/2017 07:30 AM    INR POC 2.1 03/03/2017 07:25 AM   RTO in 1 month  Gino ALMAZAN

## 2017-06-26 ENCOUNTER — OFFICE VISIT (OUTPATIENT)
Dept: FAMILY MEDICINE CLINIC | Age: 72
End: 2017-06-26

## 2017-06-26 VITALS
TEMPERATURE: 97.7 F | SYSTOLIC BLOOD PRESSURE: 128 MMHG | HEIGHT: 71 IN | DIASTOLIC BLOOD PRESSURE: 70 MMHG | WEIGHT: 266.2 LBS | RESPIRATION RATE: 16 BRPM | BODY MASS INDEX: 37.27 KG/M2 | HEART RATE: 55 BPM | OXYGEN SATURATION: 97 %

## 2017-06-26 DIAGNOSIS — Z23 ENCOUNTER FOR IMMUNIZATION: Primary | ICD-10-CM

## 2017-06-26 DIAGNOSIS — I48.91 ATRIAL FIBRILLATION, UNSPECIFIED TYPE (HCC): ICD-10-CM

## 2017-06-26 LAB
INR BLD: 2.6
PT POC: 31.2 SECONDS
VALID INTERNAL CONTROL?: YES

## 2017-06-26 NOTE — PROGRESS NOTES
[facility] presents today for PT and INR anticoagulation management. Vital signs reviewed. 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 Anticoagulation. Dx:  Atrial fibrillation. Current symptoms: none  Current control agent: none  Current anticoagulant: warfarin 5 mg Sat Sun  Tues and Thurs.   None the other days  History of bleeding problems: none  Lab Results   Component Value Date/Time    INR POC 2.6 06/26/2017 07:33 AM    INR POC 2.5 05/17/2017 07:25 AM    INR POC 2.7 04/19/2017 07:30 AM   RTO in 1 month   OV with labs in August  Cristina ALMAZAN

## 2017-06-26 NOTE — PROGRESS NOTES
Results for orders placed or performed in visit on 06/26/17   AMB POC PT/INR   Result Value Ref Range    VALID INTERNAL CONTROL POC Yes     Prothrombin time (POC) 31.2 seconds    INR POC 2.6

## 2017-07-06 ENCOUNTER — TELEPHONE (OUTPATIENT)
Dept: FAMILY MEDICINE CLINIC | Age: 72
End: 2017-07-06

## 2017-07-06 NOTE — TELEPHONE ENCOUNTER
Mr Samantha Toussaint is having a tooth removed on the 18th. How long should he be off his coumadin?   [phone]

## 2017-07-06 NOTE — TELEPHONE ENCOUNTER
Hold Sunday's, (does not take on Monday from the [facility]) dose restart Tuesday night

## 2017-07-12 RX ORDER — MELOXICAM 7.5 MG/1
TABLET ORAL
Qty: 90 TAB | Refills: 0 | Status: SHIPPED | OUTPATIENT
Start: 2017-07-12 | End: 2017-11-01 | Stop reason: SDUPTHER

## 2017-08-01 ENCOUNTER — OFFICE VISIT (OUTPATIENT)
Dept: FAMILY MEDICINE CLINIC | Age: 72
End: 2017-08-01

## 2017-08-01 VITALS — HEIGHT: 71 IN

## 2017-08-01 DIAGNOSIS — I48.91 ATRIAL FIBRILLATION, UNSPECIFIED TYPE (HCC): Primary | ICD-10-CM

## 2017-08-01 DIAGNOSIS — Z79.01 CURRENT USE OF LONG TERM ANTICOAGULATION: ICD-10-CM

## 2017-08-01 LAB
INR BLD: 1.8
PT POC: 21.1 SECONDS
VALID INTERNAL CONTROL?: YES

## 2017-08-01 NOTE — MR AVS SNAPSHOT
Visit Information Date & Time Provider Department Dept. Phone Encounter #  
 8/1/2017  7:30 AM Kristen Fallon NP [facility] [address] [facility] [phone] [id number] Your Appointments 9/8/2017  7:30 AM  
ESTABLISHED PATIENT with Kristen Fallon NP  
[address] ([address]) Appt Note: PT  
 [address] [address]  
[address] [facility] [place] [address] [address] Upcoming Health Maintenance Date Due  
 GLAUCOMA SCREENING Q2Y 4/4/2012 FOBT Q 1 YEAR AGE 50-75 1/29/2017 INFLUENZA AGE 9 TO ADULT 8/1/2017 Pneumococcal 65+ Low/Medium Risk (2 of 2 - PPSV23) 2/3/2018 MEDICARE YEARLY EXAM 2/4/2018 DTaP/Tdap/Td series (2 - Td) 6/26/2027 Allergies as of 8/1/2017  Review Complete On: 8/1/2017 By: Katja Ornelas RN Severity Noted Reaction Type Reactions Morphine  05/24/2013    Unknown (comments) Current Immunizations  Never Reviewed Name Date Td, Adsorbed PF 6/26/2017 Not reviewed this visit You Were Diagnosed With   
  
 Codes Comments Atrial fibrillation, unspecified type ([facility]    -  Primary ICD-10-CM: I48.91 
ICD-9-CM: 427.31 Current use of long term anticoagulation     ICD-10-CM: Z79.01 
ICD-9-CM: V58.61 Vitals Height(growth percentile) Smoking Status 5' 10.5\" (1.791 m) Former Smoker Preferred Pharmacy Pharmacy Name Phone [address] [place] [address] [phone] Your Updated Medication List  
  
   
This list is accurate as of: 8/1/17  7:44 AM.  Always use your most recent med list.  
  
  
  
  
 aspirin 81 mg tablet Take 81 mg by mouth. atorvastatin 80 mg tablet Commonly known as:  LIPITOR  
TAKE 1 TABLET EVERY NIGHT  
  
 fosinopril 20 mg tablet Commonly known as:  MONOPRIL Take 20 mg by mouth daily. LIDODERM 5 % Generic drug:  lidocaine  
by TransDERmal route every twenty-four (24) hours. Apply patch to the affected area for 12 hours a day and remove for 12 hours a day. meloxicam 7.5 mg tablet Commonly known as:  MOBIC  
TAKE 1 TABLET EVERY DAY AS NEEDED FOR SEVERE JOINT PAIN  
  
 MSM PO Take  by mouth.  
  
 multivitamin tablet Commonly known as:  ONE A DAY Take 1 tablet by mouth daily. warfarin 5 mg tablet Commonly known as:  COUMADIN Take 1 Tab by mouth daily. Pt takes 1 tab Sat.,sun., tues. , and thurs. None on other days We Performed the Following AMB POC PT/INR [00805 CPT(R)] COLLECTION CAPILLARY BLOOD SPECIMEN [70396 CPT(R)] Introducing hospitals & [facility]! Dear Samanta Jean: Thank you for requesting a [employer] account. Our records indicate that you already have an active [employer] account. You can access your account anytime at [email or web address] Did you know that you can access your hospital and ER discharge instructions at any time in [employer]? You can also review all of your test results from your hospital stay or ER visit. Additional Information If you have questions, please visit the Frequently Asked Questions section of the [employer] website at [email or web address] Remember, [employer] is NOT to be used for urgent needs. For medical emergencies, dial 911. Now available from your iPhone and Android! Please provide this summary of care documentation to your next provider. Your primary care clinician is listed as Mirza Camacho. If you have any questions after today's visit, please call [phone].

## 2017-08-01 NOTE — PROGRESS NOTES
Subjective:     David Diggs is a 81 y.o. male who presents today with the following:  Chief Complaint   Patient presents with   Asba Newcomb is having a dental procedure later today. Stopped warfarin since Sunday for a dental procedure today. Knows when  to restart his warfarin post procedure. Anticoagulation. Dx:  Atrial fibrillation. Current symptoms: none  Current control agent: none  Current anticoagulant: aspirin warfarin 5 mg tab Sat, Sun, tues, and Thursday. History of bleeding problems: none  Lab Results   Component Value Date/Time    INR POC 1.8 08/01/2017 07:30 AM    INR POC 2.6 06/26/2017 07:33 AM    INR POC 2.5 05/17/2017 07:25 AM     .            Allergies   Allergen Reactions    Morphine Unknown (comments)         Current Outpatient Prescriptions:     meloxicam (MOBIC) 7.5 mg tablet, TAKE 1 TABLET EVERY DAY AS NEEDED FOR SEVERE JOINT PAIN, Disp: 90 Tab, Rfl: 0    warfarin (COUMADIN) 5 mg tablet, Take 1 Tab by mouth daily. Pt takes 1 tab Sat.,sun., tues. , and thurs. None on other days, Disp: 90 Tab, Rfl: 2    atorvastatin (LIPITOR) 80 mg tablet, TAKE 1 TABLET EVERY NIGHT, Disp: 90 Tab, Rfl: 2    METHYLSULFONYLMETHANE (MSM PO), Take  by mouth., Disp: , Rfl:     multivitamin (ONE A DAY) tablet, Take 1 tablet by mouth daily. , Disp: , Rfl:     lidocaine (LIDODERM) 5 %(700 mg/patch), by TransDERmal route every twenty-four (24) hours. Apply patch to the affected area for 12 hours a day and remove for 12 hours a day., Disp: , Rfl:     aspirin 81 mg tablet, Take 81 mg by mouth., Disp: , Rfl:     fosinopril (MONOPRIL) 20 mg tablet, Take 20 mg by mouth daily. , Disp: , Rfl:     Past Medical History:   Diagnosis Date    Acute MI ([address]     Arthritis     Atrial fibrillation ([facility]     Congestive heart failure, unspecified     mild    Edema     Hypercholesterolemia     Hypertension     Unspecified vitamin D deficiency        Past Surgical History:   Procedure Laterality Date    HX ANGIOPLASTY      HX KNEE ARTHROSCOPY Right     HX TONSILLECTOMY         History   Smoking Status    Former Smoker   Smokeless Tobacco    Never Used       Social History     Social History    Marital status:      Spouse name: N/A    Number of children: N/A    Years of education: N/A     Social History Main Topics    Smoking status: Former Smoker    Smokeless tobacco: Never Used    Alcohol use No    Drug use: Not on file    Sexual activity: Not on file     Other Topics Concern     Service Yes    Blood Transfusions No    Caffeine Concern No    Occupational Exposure No    Hobby Hazards Yes     fishing    Sleep Concern No    Stress Concern No    Weight Concern Yes     over weight    Special Diet No    Back Care Yes     pain    Exercise Yes    Bike Helmet Yes    Seat Belt Yes    Self-Exams Yes     Social History Narrative       Family History   Problem Relation Age of Onset    Lung Disease Mother     Cancer Mother      Lung--smoker    Heart Disease Father     Cancer Father      Prostate    No Known Problems Sister              Results for orders placed or performed in visit on 08/01/17   AMB POC PT/INR   Result Value Ref Range    VALID INTERNAL CONTROL POC Yes     Prothrombin time (POC) 21.1 seconds    INR POC 1.8        Results for orders placed or performed in visit on 08/01/17   AMB POC PT/INR   Result Value Ref Range    VALID INTERNAL CONTROL POC Yes     Prothrombin time (POC) 21.1 seconds    INR POC 1.8        Assessment/ Plan:     Diagnoses and all orders for this visit:    1. Atrial fibrillation, unspecified type (HCC)  -     COLLECTION CAPILLARY BLOOD SPECIMEN  -     AMB POC PT/INR    2. Current use of long term anticoagulation  -     COLLECTION CAPILLARY BLOOD SPECIMEN  -     AMB POC PT/INR         1. Atrial fibrillation, unspecified type ([address]    2.  Current use of long term anticoagulation        Orders Placed This Encounter    COLLECTION CAPILLARY BLOOD SPECIMEN    AMB POC PT/INR     RTO in 1 month for OV and labs  Continue current medical plan. Verbal and written instructions (see AVS) provided.  Patient expresses understanding of diagnosis and treatment plan.     Rubén Mac, FNP-C

## 2017-09-08 ENCOUNTER — OFFICE VISIT (OUTPATIENT)
Dept: FAMILY MEDICINE CLINIC | Age: 72
End: 2017-09-08

## 2017-09-08 VITALS
HEIGHT: 71 IN | TEMPERATURE: 97.4 F | OXYGEN SATURATION: 97 % | HEART RATE: 48 BPM | WEIGHT: 251 LBS | SYSTOLIC BLOOD PRESSURE: 136 MMHG | DIASTOLIC BLOOD PRESSURE: 66 MMHG | BODY MASS INDEX: 35.14 KG/M2

## 2017-09-08 DIAGNOSIS — I25.10 CORONARY ARTERY DISEASE INVOLVING NATIVE CORONARY ARTERY OF NATIVE HEART WITHOUT ANGINA PECTORIS: ICD-10-CM

## 2017-09-08 DIAGNOSIS — I10 ESSENTIAL HYPERTENSION: ICD-10-CM

## 2017-09-08 DIAGNOSIS — Z79.01 CURRENT USE OF LONG TERM ANTICOAGULATION: ICD-10-CM

## 2017-09-08 DIAGNOSIS — E78.5 HYPERLIPIDEMIA, UNSPECIFIED HYPERLIPIDEMIA TYPE: ICD-10-CM

## 2017-09-08 DIAGNOSIS — I48.91 ATRIAL FIBRILLATION, UNSPECIFIED TYPE (HCC): Primary | ICD-10-CM

## 2017-09-08 LAB
INR BLD: 2.4
PT POC: 29.3 SECONDS
VALID INTERNAL CONTROL?: YES

## 2017-09-08 NOTE — MR AVS SNAPSHOT
Visit Information Date & Time Provider Department Dept. Phone Encounter #  
 9/8/2017  8:00 AM Reina Samano NP [facility] [address] [facility] [place] [phone] [id number] Your Appointments 10/9/2017  7:45 AM  
ESTABLISHED PATIENT with Reina Samano NP  
[address] ([facility]) Appt Note: pt  
 [address] [address]  
[address] [facility] [place] [address] [address] Upcoming Health Maintenance Date Due  
 GLAUCOMA SCREENING Q2Y 4/4/2012 FOBT Q 1 YEAR AGE 50-75 1/29/2017 INFLUENZA AGE 9 TO ADULT 8/1/2017 Pneumococcal 65+ Low/Medium Risk (2 of 2 - PPSV23) 2/3/2018 MEDICARE YEARLY EXAM 2/4/2018 DTaP/Tdap/Td series (2 - Td) 6/26/2027 Allergies as of 9/8/2017  Review Complete On: 9/8/2017 By: Reina Samano NP Severity Noted Reaction Type Reactions Morphine  05/24/2013    Unknown (comments) Current Immunizations  Never Reviewed Name Date Td, Adsorbed PF 6/26/2017 Not reviewed this visit You Were Diagnosed With   
  
 Codes Comments Atrial fibrillation, unspecified type ([facility]    -  Primary ICD-10-CM: I48.91 
ICD-9-CM: 427.31 Current use of long term anticoagulation     ICD-10-CM: Z79.01 
ICD-9-CM: V58.61 Hyperlipidemia, unspecified hyperlipidemia type     ICD-10-CM: E78.5 ICD-9-CM: 272.4 Coronary artery disease involving native coronary artery of native heart without angina pectoris     ICD-10-CM: I25.10 ICD-9-CM: 414.01 Essential hypertension     ICD-10-CM: I10 
ICD-9-CM: 401.9 Vitals BP Pulse Temp Height(growth percentile) 136/66 (BP 1 Location: Left arm, BP Patient Position: Sitting) (!) 48 97.4 °F (36.3 °C) (Temporal) 5' 10.5\" (1.791 m) Weight(growth percentile) SpO2 BMI Smoking Status 251 lb (113.9 kg) 97% 35.51 kg/m2 Former Smoker Vitals History BMI and BSA Data Body Mass Index Body Surface Area 35.51 kg/m 2 2.38 m 2 Preferred Pharmacy Pharmacy Name Phone [facility] [address] [place] [address] [phone] Your Updated Medication List  
  
   
This list is accurate as of: 9/8/17  8:54 AM.  Always use your most recent med list.  
  
  
  
  
 aspirin 81 mg tablet Take 81 mg by mouth. atorvastatin 80 mg tablet Commonly known as:  LIPITOR  
TAKE 1 TABLET EVERY NIGHT  
  
 fosinopril 20 mg tablet Commonly known as:  MONOPRIL Take 20 mg by mouth daily. LIDODERM 5 % Generic drug:  lidocaine  
by TransDERmal route every twenty-four (24) hours. Apply patch to the affected area for 12 hours a day and remove for 12 hours a day. meloxicam 7.5 mg tablet Commonly known as:  MOBIC  
TAKE 1 TABLET EVERY DAY AS NEEDED FOR SEVERE JOINT PAIN  
  
 MSM PO Take  by mouth.  
  
 multivitamin tablet Commonly known as:  ONE A DAY Take 1 tablet by mouth daily. warfarin 5 mg tablet Commonly known as:  COUMADIN Take 1 Tab by mouth daily. Pt takes 1 tab Sat.,sun., tues. , and thurs. None on other days We Performed the Following AMB POC PT/INR [35956 CPT(R)] CBC WITH AUTOMATED DIFF [56464 CPT(R)] COLLECTION VENOUS BLOOD,VENIPUNCTURE [id number] CPT(R)] LIPID PANEL [16620 CPT(R)] METABOLIC PANEL, COMPREHENSIVE [90479 CPT(R)] TSH 3RD GENERATION [87921 CPT(R)] Introducing [facility] & HEALTH SERVICES! Dear Radha Reasoner: Thank you for requesting a [employer] account. Our records indicate that you already have an active [employer] account. You can access your account anytime at [email or web address] Did you know that you can access your hospital and ER discharge instructions at any time in [employer]? You can also review all of your test results from your hospital stay or ER visit. Additional Information If you have questions, please visit the Frequently Asked Questions section of the [employer] website at [email or web address] Remember, [employer] is NOT to be used for urgent needs. For medical emergencies, dial 911. Now available from your iPhone and Android! Please provide this summary of care documentation to your next provider. Your primary care clinician is listed as Jasen Cerrato. If you have any questions after today's visit, please call [phone].

## 2017-09-08 NOTE — PROGRESS NOTES
Subjective:     Ze Edouard is a 81 y.o. male who presents today with the following:  Chief Complaint   Patient presents with    Anticoagulation    Hypertension     checkup and labs    Cholesterol Problem   Mary Mccoy presents for chronic disease management and anticoagulation medication management. Followed by Dr. Culver for cardiology noted bradycardia . Scheduled next Friday for evaluation for a pacemaker. COMPLIANT WITH MEDICATION:   HTN; Denies chest pain, dyspnea, palpitations, headache and blurred vision. Blood pressure normotensive. CAD: denies chest pain, dyspnea, cough    Hyperlipidemia: taking Lipitor will check Lipid panel today. Chronic back pain: Increased intensity noted as he looses weight. Exercising regularly, Asks for suggestions for referral. Which were provided. ROS:  Gen: denies fever, chills, fatigue, weight loss, weight gain  HEENT:denies blurry vision, nasal congestion, sore throat  Resp: denies dypsnea, cough, wheezing  CV: denies chest pain radiating to the jaws or arms, palpitations, lower extremity edema  Abd: denies nausea, vomiting, diarrhea, constipation  Neuro: denies numbness/tingling  Endo: denies polyuria, polydipsia, heat/cold intolerance  Heme: no lymphadenopathy  Anticoagulation. Dx: Atrial fibrillation. Current symptoms: none  Current control agent: none  Current anticoagulant: warfarin5 mg on SAt,Sun, Tuesday and Thursdays only  History of bleeding problems: none  Lab Results   Component Value Date/Time    INR POC 2.4 09/08/2017 08:15 AM    INR POC 1.8 08/01/2017 07:30 AM    INR POC 2.6 06/26/2017 07:33 AM       Allergies   Allergen Reactions    Morphine Unknown (comments)         Current Outpatient Prescriptions:     meloxicam (MOBIC) 7.5 mg tablet, TAKE 1 TABLET EVERY DAY AS NEEDED FOR SEVERE JOINT PAIN, Disp: 90 Tab, Rfl: 0    warfarin (COUMADIN) 5 mg tablet, Take 1 Tab by mouth daily. Pt takes 1 tab Sat.,sun., tues. , and thurs. None on other days, Disp: 90 Tab, Rfl: 2    atorvastatin (LIPITOR) 80 mg tablet, TAKE 1 TABLET EVERY NIGHT, Disp: 90 Tab, Rfl: 2    METHYLSULFONYLMETHANE (MSM PO), Take  by mouth., Disp: , Rfl:     multivitamin (ONE A DAY) tablet, Take 1 tablet by mouth daily. , Disp: , Rfl:     lidocaine (LIDODERM) 5 %(700 mg/patch), by TransDERmal route every twenty-four (24) hours. Apply patch to the affected area for 12 hours a day and remove for 12 hours a day., Disp: , Rfl:     aspirin 81 mg tablet, Take 81 mg by mouth., Disp: , Rfl:     fosinopril (MONOPRIL) 20 mg tablet, Take 20 mg by mouth daily. , Disp: , Rfl:     Past Medical History:   Diagnosis Date    Acute [facility] ([facility]     Arthritis     Atrial fibrillation (HCC)     Congestive heart failure, unspecified     mild    Edema     Hypercholesterolemia     Hypertension     Unspecified vitamin D deficiency        Past Surgical History:   Procedure Laterality Date    HX ANGIOPLASTY      HX KNEE ARTHROSCOPY Right     HX TONSILLECTOMY         History   Smoking Status    Former Smoker   Smokeless Tobacco    Never Used       Social History     Social History    Marital status:      Spouse name: N/A    Number of children: N/A    Years of education: N/A     Social History Main Topics    Smoking status: Former Smoker    Smokeless tobacco: Never Used    Alcohol use No    Drug use: None    Sexual activity: Not Asked     Other Topics Concern     Service Yes    Blood Transfusions No    Caffeine Concern No    Occupational Exposure No    Hobby Hazards Yes     fishing    Sleep Concern No    Stress Concern No    Weight Concern Yes     over weight    Special Diet No    Back Care Yes     pain    Exercise Yes    Bike Helmet Yes    Seat Belt Yes    Self-Exams Yes     Social History Narrative       Family History   Problem Relation Age of Onset    Lung Disease Mother     Cancer Mother      Lung--smoker    Heart Disease Father     Cancer Father Prostate    No Known Problems Sister          Objective:     Visit Vitals    /66 (BP 1 Location: Left arm, BP Patient Position: Sitting)    Pulse (!) 48    Temp 97.4 °F (36.3 °C) (Temporal)    Ht 5' 10.5\" (1.791 m)    Wt 251 lb (113.9 kg)    SpO2 97%    BMI 35.51 kg/m2     Body mass index is 35.51 kg/(m^2). General: Alert and oriented. No acute distress. Well nourished  HEENT :  Ears:TMs are normal. Canals are clear. Eyes: pupils equal, round, react to light and accommodation. Extra ocular movements intact. Nose: patent. Mouth and throat is clear. Neck:supple full range of motion no thyromegaly. Trachea midline, No carotid bruits. No significant lymphadenopathy  Lungs[de-identified] clear to auscultation without wheezes, rales, or rhonchi. Heart :RRR, S1 & S2 are normal intensity. No murmur; no gallop  Back: no CVA tenderness. Extremities: without clubbing, cyanosis, or edema  Pulses: radial pulses  normal  Neuro: HMF intact. Cranial nerves II through XII grossly normal.      Results for orders placed or performed in visit on 09/08/17   AMB POC PT/INR   Result Value Ref Range    VALID INTERNAL CONTROL POC Yes     Prothrombin time (POC) 29.3 seconds    INR POC 2.4        Results for orders placed or performed in visit on 09/08/17   AMB POC PT/INR   Result Value Ref Range    VALID INTERNAL CONTROL POC Yes     Prothrombin time (POC) 29.3 seconds    INR POC 2.4        Assessment/ Plan:     Diagnoses and all orders for this visit:    1. Atrial fibrillation, unspecified type (HCC)  -     AMB POC PT/INR  -     CBC WITH AUTOMATED DIFF  -     LIPID PANEL  -     TSH 3RD GENERATION  -     METABOLIC PANEL, COMPREHENSIVE  -     COLLECTION VENOUS BLOOD,VENIPUNCTURE    2. Current use of long term anticoagulation  -     AMB POC PT/INR  -     CBC WITH AUTOMATED DIFF  -     LIPID PANEL  -     TSH 3RD GENERATION  -     METABOLIC PANEL, COMPREHENSIVE  -     COLLECTION VENOUS BLOOD,VENIPUNCTURE    3.  Hyperlipidemia, unspecified hyperlipidemia type  -     CBC WITH AUTOMATED DIFF  -     LIPID PANEL  -     TSH 3RD GENERATION  -     METABOLIC PANEL, COMPREHENSIVE  -     COLLECTION VENOUS BLOOD,VENIPUNCTURE    4. Coronary artery disease involving native coronary artery of native heart without angina pectoris  -     CBC WITH AUTOMATED DIFF  -     LIPID PANEL  -     TSH 3RD GENERATION  -     METABOLIC PANEL, COMPREHENSIVE  -     COLLECTION VENOUS BLOOD,VENIPUNCTURE    5. Essential hypertension  -     CBC WITH AUTOMATED DIFF  -     LIPID PANEL  -     TSH 3RD GENERATION  -     METABOLIC PANEL, COMPREHENSIVE  -     COLLECTION VENOUS BLOOD,VENIPUNCTURE         1. Atrial fibrillation, unspecified type ([address]    2. Current use of long term anticoagulation    3. Hyperlipidemia, unspecified hyperlipidemia type    4. Coronary artery disease involving native coronary artery of native heart without angina pectoris    5. Essential hypertension        Orders Placed This Encounter    COLLECTION VENOUS BLOOD,VENIPUNCTURE    CBC WITH AUTOMATED DIFF    LIPID PANEL    TSH 3RD GENERATION    METABOLIC PANEL, COMPREHENSIVE    AMB POC PT/INR     RTO: for PT and INR and 6 months for HYN and Diabetes  Management or sooner as needed. Verbal and written instructions (see AVS) provided.  Patient expresses understanding of diagnosis and treatment plan.     IVAN Dyer

## 2017-09-09 LAB
ALBUMIN SERPL-MCNC: 4.2 G/DL (ref 3.5–4.8)
ALBUMIN/GLOB SERPL: 2 {RATIO} (ref 1.2–2.2)
ALP SERPL-CCNC: 169 IU/L (ref 39–117)
ALT SERPL-CCNC: 28 IU/L (ref 0–44)
AST SERPL-CCNC: 25 IU/L (ref 0–40)
BASOPHILS # BLD AUTO: 0 X10E3/UL (ref 0–0.2)
BASOPHILS NFR BLD AUTO: 0 %
BILIRUB SERPL-MCNC: 1 MG/DL (ref 0–1.2)
BUN SERPL-MCNC: 21 MG/DL (ref 8–27)
BUN/CREAT SERPL: 29 (ref 10–24)
CALCIUM SERPL-MCNC: 9.2 MG/DL (ref 8.6–10.2)
CHLORIDE SERPL-SCNC: 101 MMOL/L (ref 96–106)
CHOLEST SERPL-MCNC: 129 MG/DL (ref 100–199)
CO2 SERPL-SCNC: 22 MMOL/L (ref 18–29)
CREAT SERPL-MCNC: 0.72 MG/DL (ref 0.76–1.27)
EOSINOPHIL # BLD AUTO: 0.1 X10E3/UL (ref 0–0.4)
EOSINOPHIL NFR BLD AUTO: 2 %
ERYTHROCYTE [DISTWIDTH] IN BLOOD BY AUTOMATED COUNT: 13.9 % (ref 12.3–15.4)
GLOBULIN SER CALC-MCNC: 2.1 G/DL (ref 1.5–4.5)
GLUCOSE SERPL-MCNC: 90 MG/DL (ref 65–99)
HCT VFR BLD AUTO: 47.8 % (ref 37.5–51)
HDLC SERPL-MCNC: 46 MG/DL
HGB BLD-MCNC: 16.2 G/DL (ref 12.6–17.7)
IMM GRANULOCYTES # BLD: 0 X10E3/UL (ref 0–0.1)
IMM GRANULOCYTES NFR BLD: 0 %
LDLC SERPL CALC-MCNC: 73 MG/DL (ref 0–99)
LYMPHOCYTES # BLD AUTO: 0.8 X10E3/UL (ref 0.7–3.1)
LYMPHOCYTES NFR BLD AUTO: 12 %
MCH RBC QN AUTO: 32.1 PG (ref 26.6–33)
MCHC RBC AUTO-ENTMCNC: 33.9 G/DL (ref 31.5–35.7)
MCV RBC AUTO: 95 FL (ref 79–97)
MONOCYTES # BLD AUTO: 0.8 X10E3/UL (ref 0.1–0.9)
MONOCYTES NFR BLD AUTO: 11 %
NEUTROPHILS # BLD AUTO: 5.4 X10E3/UL (ref 1.4–7)
NEUTROPHILS NFR BLD AUTO: 75 %
PLATELET # BLD AUTO: 193 X10E3/UL (ref 150–379)
POTASSIUM SERPL-SCNC: 4.4 MMOL/L (ref 3.5–5.2)
PROT SERPL-MCNC: 6.3 G/DL (ref 6–8.5)
RBC # BLD AUTO: 5.05 X10E6/UL (ref 4.14–5.8)
SODIUM SERPL-SCNC: 141 MMOL/L (ref 134–144)
TRIGL SERPL-MCNC: 50 MG/DL (ref 0–149)
TSH SERPL DL<=0.005 MIU/L-ACNC: 2.62 UIU/ML (ref 0.45–4.5)
VLDLC SERPL CALC-MCNC: 10 MG/DL (ref 5–40)
WBC # BLD AUTO: 7.1 X10E3/UL (ref 3.4–10.8)

## 2017-09-11 NOTE — PROGRESS NOTES
CBC no anemia  Lipid panel WNL great job!!  TSH WNL no changes  Metabolic panel relatively normal . Good kidney function Low creatinine usually not a concern Elevated Alk. Phos watch for fatty liver disease. Healthy diet and exercise will help maintain kidney and liver function.

## 2017-10-05 ENCOUNTER — APPOINTMENT (OUTPATIENT)
Dept: GENERAL RADIOLOGY | Age: 72
End: 2017-10-05
Attending: INTERNAL MEDICINE
Payer: MEDICARE

## 2017-10-05 ENCOUNTER — HOSPITAL ENCOUNTER (OUTPATIENT)
Dept: NON INVASIVE DIAGNOSTICS | Age: 72
Discharge: HOME | End: 2017-10-05
Attending: INTERNAL MEDICINE | Admitting: INTERNAL MEDICINE
Payer: MEDICARE

## 2017-10-05 VITALS
RESPIRATION RATE: 18 BRPM | WEIGHT: 243 LBS | TEMPERATURE: 98.6 F | HEIGHT: 72 IN | SYSTOLIC BLOOD PRESSURE: 138 MMHG | HEART RATE: 70 BPM | DIASTOLIC BLOOD PRESSURE: 75 MMHG | OXYGEN SATURATION: 97 % | BODY MASS INDEX: 32.91 KG/M2

## 2017-10-05 LAB
ALBUMIN SERPL-MCNC: 3.4 G/DL (ref 3.5–5)
ALBUMIN/GLOB SERPL: 1 {RATIO} (ref 1.1–2.2)
ALP SERPL-CCNC: 182 U/L (ref 45–117)
ALT SERPL-CCNC: 35 U/L (ref 12–78)
ANION GAP SERPL CALC-SCNC: 7 MMOL/L (ref 5–15)
AST SERPL-CCNC: 27 U/L (ref 15–37)
BASOPHILS # BLD: 0 K/UL (ref 0–0.1)
BASOPHILS NFR BLD: 1 % (ref 0–1)
BILIRUB SERPL-MCNC: 1.1 MG/DL (ref 0.2–1)
BUN SERPL-MCNC: 22 MG/DL (ref 6–20)
BUN/CREAT SERPL: 31 (ref 12–20)
CALCIUM SERPL-MCNC: 8.8 MG/DL (ref 8.5–10.1)
CHLORIDE SERPL-SCNC: 107 MMOL/L (ref 97–108)
CO2 SERPL-SCNC: 27 MMOL/L (ref 21–32)
CREAT SERPL-MCNC: 0.71 MG/DL (ref 0.7–1.3)
EOSINOPHIL # BLD: 0.1 K/UL (ref 0–0.4)
EOSINOPHIL NFR BLD: 2 % (ref 0–7)
ERYTHROCYTE [DISTWIDTH] IN BLOOD BY AUTOMATED COUNT: 14.4 % (ref 11.5–14.5)
GLOBULIN SER CALC-MCNC: 3.5 G/DL (ref 2–4)
GLUCOSE SERPL-MCNC: 90 MG/DL (ref 65–100)
HCT VFR BLD AUTO: 46 % (ref 36.6–50.3)
HGB BLD-MCNC: 16 G/DL (ref 12.1–17)
INR PPP: 1.5 (ref 0.9–1.1)
LYMPHOCYTES # BLD: 0.9 K/UL (ref 0.8–3.5)
LYMPHOCYTES NFR BLD: 15 % (ref 12–49)
MAGNESIUM SERPL-MCNC: 1.9 MG/DL (ref 1.6–2.4)
MCH RBC QN AUTO: 31.9 PG (ref 26–34)
MCHC RBC AUTO-ENTMCNC: 34.8 G/DL (ref 30–36.5)
MCV RBC AUTO: 91.6 FL (ref 80–99)
MONOCYTES # BLD: 0.8 K/UL (ref 0–1)
MONOCYTES NFR BLD: 13 % (ref 5–13)
NEUTS SEG # BLD: 4.3 K/UL (ref 1.8–8)
NEUTS SEG NFR BLD: 69 % (ref 32–75)
PLATELET # BLD AUTO: 178 K/UL (ref 150–400)
POTASSIUM SERPL-SCNC: 4.2 MMOL/L (ref 3.5–5.1)
PROT SERPL-MCNC: 6.9 G/DL (ref 6.4–8.2)
PROTHROMBIN TIME: 15.4 SEC (ref 9–11.1)
RBC # BLD AUTO: 5.02 M/UL (ref 4.1–5.7)
SODIUM SERPL-SCNC: 141 MMOL/L (ref 136–145)
WBC # BLD AUTO: 6.1 K/UL (ref 4.1–11.1)

## 2017-10-05 PROCEDURE — C1786 PMKR, SINGLE, RATE-RESP: HCPCS

## 2017-10-05 PROCEDURE — 77030031139 HC SUT VCRL2 J&J -A

## 2017-10-05 PROCEDURE — C1898 LEAD, PMKR, OTHER THAN TRANS: HCPCS

## 2017-10-05 PROCEDURE — 74011250636 HC RX REV CODE- 250/636: Performed by: INTERNAL MEDICINE

## 2017-10-05 PROCEDURE — 36415 COLL VENOUS BLD VENIPUNCTURE: CPT | Performed by: INTERNAL MEDICINE

## 2017-10-05 PROCEDURE — C1894 INTRO/SHEATH, NON-LASER: HCPCS

## 2017-10-05 PROCEDURE — 80053 COMPREHEN METABOLIC PANEL: CPT | Performed by: INTERNAL MEDICINE

## 2017-10-05 PROCEDURE — 77030018836 HC SOL IRR NACL ICUM -A

## 2017-10-05 PROCEDURE — 77030018729 HC ELECTRD DEFIB PAD CARD -B

## 2017-10-05 PROCEDURE — 71010 XR CHEST PORT: CPT

## 2017-10-05 PROCEDURE — 85610 PROTHROMBIN TIME: CPT | Performed by: INTERNAL MEDICINE

## 2017-10-05 PROCEDURE — 83735 ASSAY OF MAGNESIUM: CPT | Performed by: INTERNAL MEDICINE

## 2017-10-05 PROCEDURE — L3670 SO ACRO/CLAV CAN WEB PRE OTS: HCPCS

## 2017-10-05 PROCEDURE — 33207 INSERT HEART PM VENTRICULAR: CPT

## 2017-10-05 PROCEDURE — 74011000250 HC RX REV CODE- 250

## 2017-10-05 PROCEDURE — 77030002996 HC SUT SLK J&J -A

## 2017-10-05 PROCEDURE — 74011000250 HC RX REV CODE- 250: Performed by: INTERNAL MEDICINE

## 2017-10-05 PROCEDURE — C1892 INTRO/SHEATH,FIXED,PEEL-AWAY: HCPCS

## 2017-10-05 PROCEDURE — 85025 COMPLETE CBC W/AUTO DIFF WBC: CPT | Performed by: INTERNAL MEDICINE

## 2017-10-05 PROCEDURE — 77030011640 HC PAD GRND REM COVD -A

## 2017-10-05 PROCEDURE — 74011250636 HC RX REV CODE- 250/636

## 2017-10-05 RX ORDER — FENTANYL CITRATE 50 UG/ML
25-50 INJECTION, SOLUTION INTRAMUSCULAR; INTRAVENOUS
Status: DISCONTINUED | OUTPATIENT
Start: 2017-10-05 | End: 2017-10-05 | Stop reason: HOSPADM

## 2017-10-05 RX ORDER — LIDOCAINE HYDROCHLORIDE AND EPINEPHRINE 10; 10 MG/ML; UG/ML
1-20 INJECTION, SOLUTION INFILTRATION; PERINEURAL
Status: DISCONTINUED | OUTPATIENT
Start: 2017-10-05 | End: 2017-10-05 | Stop reason: HOSPADM

## 2017-10-05 RX ORDER — MIDAZOLAM HYDROCHLORIDE 1 MG/ML
INJECTION, SOLUTION INTRAMUSCULAR; INTRAVENOUS
Status: COMPLETED
Start: 2017-10-05 | End: 2017-10-05

## 2017-10-05 RX ORDER — HEPARIN SODIUM 200 [USP'U]/100ML
INJECTION, SOLUTION INTRAVENOUS
Status: COMPLETED
Start: 2017-10-05 | End: 2017-10-05

## 2017-10-05 RX ORDER — FENTANYL CITRATE 50 UG/ML
INJECTION, SOLUTION INTRAMUSCULAR; INTRAVENOUS
Status: COMPLETED
Start: 2017-10-05 | End: 2017-10-05

## 2017-10-05 RX ORDER — BACITRACIN 50000 [IU]/1
INJECTION, POWDER, FOR SOLUTION INTRAMUSCULAR
Status: COMPLETED
Start: 2017-10-05 | End: 2017-10-05

## 2017-10-05 RX ORDER — MIDAZOLAM HYDROCHLORIDE 1 MG/ML
.5-2 INJECTION, SOLUTION INTRAMUSCULAR; INTRAVENOUS
Status: DISCONTINUED | OUTPATIENT
Start: 2017-10-05 | End: 2017-10-05 | Stop reason: HOSPADM

## 2017-10-05 RX ORDER — HEPARIN SODIUM 200 [USP'U]/100ML
1000 INJECTION, SOLUTION INTRAVENOUS ONCE
Status: COMPLETED | OUTPATIENT
Start: 2017-10-05 | End: 2017-10-05

## 2017-10-05 RX ORDER — BACITRACIN 50000 [IU]/1
50000 INJECTION, POWDER, FOR SOLUTION INTRAMUSCULAR ONCE
Status: COMPLETED | OUTPATIENT
Start: 2017-10-05 | End: 2017-10-05

## 2017-10-05 RX ORDER — CEFAZOLIN SODIUM 1 G/3ML
INJECTION, POWDER, FOR SOLUTION INTRAMUSCULAR; INTRAVENOUS
Status: DISCONTINUED
Start: 2017-10-05 | End: 2017-10-05 | Stop reason: HOSPADM

## 2017-10-05 RX ORDER — CEFAZOLIN SODIUM IN 0.9 % NACL 2 G/100 ML
2 PLASTIC BAG, INJECTION (ML) INTRAVENOUS ONCE
Status: COMPLETED | OUTPATIENT
Start: 2017-10-05 | End: 2017-10-05

## 2017-10-05 RX ADMIN — MIDAZOLAM HYDROCHLORIDE 2 MG: 1 INJECTION, SOLUTION INTRAMUSCULAR; INTRAVENOUS at 10:22

## 2017-10-05 RX ADMIN — FENTANYL CITRATE 25 MCG: 50 INJECTION, SOLUTION INTRAMUSCULAR; INTRAVENOUS at 10:35

## 2017-10-05 RX ADMIN — BACITRACIN 50000 UNITS: 5000 INJECTION, POWDER, FOR SOLUTION INTRAMUSCULAR at 10:35

## 2017-10-05 RX ADMIN — LIDOCAINE HYDROCHLORIDE AND EPINEPHRINE 20 MG: 10; 10 INJECTION, SOLUTION INFILTRATION; PERINEURAL at 10:23

## 2017-10-05 RX ADMIN — FENTANYL CITRATE 50 MCG: 50 INJECTION, SOLUTION INTRAMUSCULAR; INTRAVENOUS at 10:22

## 2017-10-05 RX ADMIN — CEFAZOLIN 2 G: 10 INJECTION, POWDER, FOR SOLUTION INTRAVENOUS; PARENTERAL at 10:09

## 2017-10-05 RX ADMIN — BACITRACIN 50000 UNITS: 50000 INJECTION, POWDER, FOR SOLUTION INTRAMUSCULAR at 10:35

## 2017-10-05 RX ADMIN — MIDAZOLAM HYDROCHLORIDE 2 MG: 1 INJECTION, SOLUTION INTRAMUSCULAR; INTRAVENOUS at 10:24

## 2017-10-05 RX ADMIN — HEPARIN SODIUM 1000 UNITS: 200 INJECTION, SOLUTION INTRAVENOUS at 10:23

## 2017-10-05 RX ADMIN — FENTANYL CITRATE 25 MCG: 50 INJECTION, SOLUTION INTRAMUSCULAR; INTRAVENOUS at 10:24

## 2017-10-05 RX ADMIN — MIDAZOLAM HYDROCHLORIDE 2 MG: 1 INJECTION INTRAMUSCULAR; INTRAVENOUS at 10:22

## 2017-10-05 NOTE — PROGRESS NOTES
TRANSFER - IN REPORT:    Verbal report received from PPI on Angy Espinoza  being received from EP for routine progression of care. Report consisted of patients Situation, Background, Assessment and Recommendations(SBAR). Information from the following report(s) Procedure Summary and MAR was reviewed with the receiving clinician. Opportunity for questions and clarification was provided. Assessment completed upon patients arrival to [address] [place] [address] care assumed. Cardiac Cath Lab Recovery Arrival Note:    Angy Espinoza arrived to [facility] recovery area. Patient procedure= Single chamber PPI. Patient on cardiac monitor, non-invasive blood pressure, SPO2 monitor. On  or O2 @ 2 lpm via NC.  IV  of NS on pump at @)20 ml/hr. Patient status doing well without problems. Patient is A&Ox 3. Patient reports no c/o. PROCEDURE SITE CHECK:    Procedure site:without any bleeding and hematoma, no pain/discomfort reported at procedure site. No change in patient status. Continue to monitor patient and status.

## 2017-10-05 NOTE — IP AVS SNAPSHOT
[address] [facility]. 
[phone] Patient: Pretty Posey MRN: [id number] MUC:6/4/2039 You are allergic to the following Allergen Reactions Morphine Unknown (comments) Recent Documentation Height Weight BMI Smoking Status 1.829 m 110.2 kg 32.96 kg/m2 Former Smoker Emergency Contacts Name Discharge Info Relation Home Work Mobile Alexa Stanford DISCHARGE CAREGIVER [3] Spouse [3] [phone] About your hospitalization You were admitted on:  October 5, 2017 You last received care in the:  [facility] CARDIAC CATH LAB You were discharged on:  October 5, 2017 Unit phone number:  [phone] Why you were hospitalized Your primary diagnosis was:  Not on File Providers Seen During Your Hospitalizations Provider Role Specialty Primary office phone Morgan Melendez MD Attending Provider Cardiology [phone] Your Primary Care Physician (PCP) Primary Care Physician Office Phone Office Fax Annia Sommers [phone] Follow-up Information None Your Appointments Monday October 09, 2017  7:45 AM EDT  
ESTABLISHED PATIENT with Mirta Andrade NP  
[address] ([address] [place] [address] Road) [address]  
[phone] Tuesday November 07, 2017  3:30 PM EST  
ESTABLISHED PATIENT with Mirta Andrade NP  
[address] ([address] [place] [address] Road) [address]  
[phone] Current Discharge Medication List  
  
ASK your doctor about these medications Dose & Instructions Dispensing Information Comments Morning Noon Evening Bedtime  
 aspirin 81 mg tablet Your last dose was: Your next dose is:    
   
   
 Dose:  81 mg Take 81 mg by mouth. Refills:  0  
     
   
   
   
  
 atorvastatin 80 mg tablet Commonly known as:  LIPITOR Your last dose was: Your next dose is: TAKE 1 TABLET EVERY NIGHT Quantity:  90 Tab Refills:  2  
     
   
   
   
  
 fosinopril 20 mg tablet Commonly known as:  MONOPRIL Your last dose was: Your next dose is:    
   
   
 Dose:  20 mg Take 20 mg by mouth daily. Refills:  0 LIDODERM 5 % Generic drug:  lidocaine Your last dose was: Your next dose is:    
   
   
 by TransDERmal route every twenty-four (24) hours. Apply patch to the affected area for 12 hours a day and remove for 12 hours a day. Refills:  0  
     
   
   
   
  
 meloxicam 7.5 mg tablet Commonly known as:  MOBIC Your last dose was: Your next dose is: TAKE 1 TABLET EVERY DAY AS NEEDED FOR SEVERE JOINT PAIN Quantity:  90 Tab Refills:  0 MSM PO Your last dose was: Your next dose is: Take  by mouth. Refills:  0  
     
   
   
   
  
 multivitamin tablet Commonly known as:  ONE A DAY Your last dose was: Your next dose is:    
   
   
 Dose:  1 Tab Take 1 tablet by mouth daily. Refills:  0  
     
   
   
   
  
 warfarin 5 mg tablet Commonly known as:  COUMADIN Your last dose was: Your next dose is:    
   
   
 Dose:  5 mg Take 1 Tab by mouth daily. Pt takes 1 tab Sat.,sun., tues. , and thurs. None on other days Quantity:  90 Tab Refills:  2 Discharge Instructions DISCHARGE INSTRUCTIONS FOR PATIENTS WITH PACEMAKERS You had a permanent ventricular pacemaker implanted by Dr. Sarah Dickey at the left chest on 10/5/2017 due to a slow heart rate problem. 1. Remember to call for an appointment in 2-4 weeks [phone] to check healing and implant programming with Dr. Daya Henderson nurse, Kim Jaramillo 2. Medic Alert Bracelets are available from your pharmacist to wear at all times if you choose to wear one. 3. Carry your ID card for pacemaker with you at all times. This card will be given to you in the hospital or mailed to you. 4. The pacemaker will bulge slightly under your skin. The bulge will decrease in size over the next few weeks. Please notify the doctor's office if you notice any of the following around your site: A.  A bruise that does not go away. B.  Soreness or yellow, green, or brown drainage from the site. C. Any swelling from the site. D. If you have a fever of 100 degrees or higher that lasts for a few days. INCISION CARE 1.  Leave skin glue over your site until it starts to fall off, usually in a few weeks. 2.  You may shower after 3 days as long as your incision isnt submerged or directly sprayed upon until well healed. 3.  For comfort, wear loose fitting clothing. 4.  Report any signs of infection, fever, pain, swelling, redness, oozing, or heat at site especially if these symptoms increase after the first 3 to 4 days. ACTIVITY PRECAUTIONS 1. Avoid rough contact with the implant site. 2. No driving for 14 days. 3. Avoid lifting your arm over your head, carrying anything on the affected side, or lifting over 10 pounds for 30 days. For the first 2 days only bend your arm at the elbow. 4. Any extreme activity such as golf, weight lifting or exercise biking should be restricted for 60 days. 5. Do not carry objects by holding them against your implant site. 6.  No shooting rifles or any type of gun with the affected shoulder permanently. SPECIAL PRECAUTIONS 1. You should avoid all strong magnetic fields, such as arc welding, large transformers, large motors. 2.  You may not have an MRI which uses a strong magnet to take pictures unless both a cardiologist and radiologist approve. 3. Treatments or surgery that requires diathermy or electrocautery should be discussed with your doctor before scheduled. 4. Avoid radio frequency transmitters, including radar. 5. Advise dentist or other medical personnel you see that you have a pacemaker. 6.  Cell phones and microwave oven use is okay. 7.  If you plan to move or take a trip to a new area, the doctor's office will give you a name of a doctor to contact for any problems. ANTIBIOTIC THERAPY During the first 8 weeks after your pacemaker insertion, you may need antibiotics before any dental work or certain tests or operations. Let the dentist or doctor who is caring for you know that you have had an implanted device. Discharge Orders None ACO Transitions of Care Introducing [employer] [address] offers a voluntary care coordination program to provide high quality service and care to [facility] fee-for-service beneficiaries. Julio Jaramillo was designed to help you enhance your health and well-being through the following services: ? Transitions of Care  support for individuals who are transitioning from one care setting to another (example: Hospital to home). ? Chronic and Complex Care Coordination  support for individuals and caregivers of those with serious or chronic illnesses or with more than one chronic (ongoing) condition and those who take a number of different medications. If you meet specific medical criteria, a [facility] [address] may call you directly to coordinate your care with your primary care physician and your other care providers. For questions about the [facility] programs, please, contact your physicians office. For general questions or additional information about Accountable Care Organizations: 
Please visit www.medicare.gov/acos. html or call 1-800-MEDICARE (6-932.952.9723) TTY users should call 9-835.938.9366. Introducing [facility] & HE[facility] SERVICES! Dear Hardeep Ontiveros: Thank you for requesting a [employer] account. Our records indicate that you already have an active [employer] account. You can access your account anytime at [email or web address]. [email or web address] Did you know that you can access your hospital and ER discharge instructions at any time in [employer]? You can also review all of your test results from your hospital stay or ER visit. Additional Information If you have questions, please visit the Frequently Asked Questions section of the [employer] website at [email or web address] Remember, [employer] is NOT to be used for urgent needs. For medical emergencies, dial 911. Now available from your iPhone and Android! General Information Please provide this summary of care documentation to your next provider. Patient Signature:  ____________________________________________________________ Date:  ____________________________________________________________  
  
Kei Russell Provider Signature:  ____________________________________________________________ Date:  ____________________________________________________________

## 2017-10-05 NOTE — DISCHARGE INSTRUCTIONS
DISCHARGE INSTRUCTIONS FOR PATIENTS WITH PACEMAKERS    You had a permanent ventricular pacemaker implanted by Dr. Vinod Sousa at the left chest on 10/5/2017 due to a slow heart rate problem. 1. Remember to call for an appointment in 2-4 weeks [phone] to check healing and implant programming with Dr. Sneha Zurita nurse, Mandi Fink. 2. Medic Alert Bracelets are available from your pharmacist to wear at all times if you choose to wear one. 3. Carry your ID card for pacemaker with you at all times. This card will be given to you in the hospital or mailed to you. 4. The pacemaker will bulge slightly under your skin. The bulge will decrease in size over the next few weeks. Please notify the doctor's office if you notice any of the following around your site:   A.  A bruise that does not go away. B.  Soreness or yellow, green, or brown drainage from the site. C. Any swelling from the site. D. If you have a fever of 100 degrees or higher that lasts for a few days. INCISION CARE       1.  Leave skin glue over your site until it starts to fall off, usually in a few weeks. 2.  You may shower after 3 days as long as your incision isnt submerged or directly sprayed upon until well healed. 3.  For comfort, wear loose fitting clothing. 4.  Report any signs of infection, fever, pain, swelling, redness, oozing, or heat at site especially if these symptoms increase after the first 3 to 4 days. ACTIVITY PRECAUTIONS     1. Avoid rough contact with the implant site. 2. No driving for 14 days. 3. Avoid lifting your arm over your head, carrying anything on the affected side, or lifting over 10 pounds for 30 days. For the first 2 days only bend your arm at the elbow. 4. Any extreme activity such as golf, weight lifting or exercise biking should be restricted for 60 days. 5. Do not carry objects by holding them against your implant site.    6.  No shooting rifles or any type of gun with the affected shoulder permanently. SPECIAL PRECAUTIONS     1. You should avoid all strong magnetic fields, such as arc welding, large transformers, large motors. 2.  You may not have an MRI which uses a strong magnet to take pictures unless both a cardiologist and radiologist approve. 3.  Treatments or surgery that requires diathermy or electrocautery should be discussed with your doctor before scheduled. 4. Avoid radio frequency transmitters, including radar. 5. Advise dentist or other medical personnel you see that you have a pacemaker. 6.  Cell phones and microwave oven use is okay. 7.  If you plan to move or take a trip to a new area, the doctor's office will give you a name of a doctor to contact for any problems. ANTIBIOTIC THERAPY    During the first 8 weeks after your pacemaker insertion, you may need antibiotics before any dental work or certain tests or operations. Let the dentist or doctor who is caring for you know that you have had an implanted device.

## 2017-10-05 NOTE — PROGRESS NOTES
Cardiac Cath Lab Recovery Arrival Note:      Jose Hodge arrived to Cardiac Cath Lab, Recovery Area. Staff introduced to patient. Patient identifiers verified with NAME and DATE OF BIRTH. Procedure verified with patient. Consent forms reviewed and signed by patient or authorized representative and verified. Allergies verified. Patient and family oriented to department. Patient and family informed of procedure and plan of care. Questions answered with review. Patient prepped for procedure, per orders from physician, prior to arrival.    Patient on cardiac monitor, non-invasive blood pressure, SPO2 monitor. On room air. Patient is A&Ox 3. Patient reports no c/o. Patient in stretcher, in low position, with side rails up, call bell within reach, patient instructed to call if assistance as needed. Patient prep in: [address], [place] [address]. Patient family has pager # none  Family in: [address] [facility] waiting area.    Prep by: Lloyd Russell, RN and Ang Sue RN

## 2017-10-05 NOTE — PROGRESS NOTES
Ambulate with to pt BR;gait steady, no c/o voiced. DC instructions reviewed with pt and spouse; both verbalize understanding. SL dc'd without difficulty; icepack and sling on. Pt dc'd to home with spouse via car.

## 2017-10-05 NOTE — PROCEDURES
[address]  [phone]    Patient ID:  Patient: Дмитрий Johnson  MRN: [id number]  Age: 81 y.o.  : 1945  Gender: male  Study Date: 10/5/2017    History: This is a male with nonreversible symptomatic bradycardia including transient complete heart block, heart rate in the 40's with atrial fibrillation, here for permanent pacemaker implant. Procedure: SINGLE CHAMBER Ventricular PACEMAKER IMPLANT (13269)    The patient was brought to the EP lab in a postabsorptive state after informed consent had been previously obtained. Continuous electrocardiographic and hemodynamic monitoring was performed. Sedation was performed by the EP nurse who was in constant attendance and my supervision throughout the procedure. Versed and fentanyl was used (start time 10:00, end time 10:50--50 minutes). Using 1% lidocaine with epinephrine, the left chest site was anesthetized. The pocket was formed in the usual fashion and ultimately axillary venous access was obtained using a micropuncture needle. One safe sheath was placed. The right ventricular lead ([id number]/58 cm) was advanced to the RV apex. The lead was fixed and tested, performance verified. Next, it was anchored to the pocket floor using two 0-silk sutures at the anchor sleeve. The pulse generator was connected to the lead and placed in the pocket after hemostasis was confirmed. Vigorous irrigation with antibiotic solution was performed. A single 0-silk suture was used to anchor the pulse generator to the pocket floor. The pocket was closed using a running 2-0 Vicryl layer x1, followed by a more superficial layer of running 4-0 Vicryl in a subcuticular fashion to close the skin. Final fluoroscopic check revealed adequate redundancy of the lead and the absence of pneumothorax. Dermabond was applied. Preoperative Diagnosis: As above. Postoperative Diagnosis: As above.   Procedure: As above. Surgeon(s) and Role:  Meron Collins MD - Primary   Anesthesia:   MAC. Estimated Blood Loss:  <5 cc. Specimens: * No specimens in log *   Findings:  As below. Complications:  None. SETTINGS:  [facility] MRI 1272, [id number]  R 8.6, 1.0, 690  VVIR 70/130    Recommendations:  After successful single chamber ventricular pacemaker implant to the left chest using transvenous leads, routine follow-up is recommended in 2-4 weeks for wound and device check.     Signed:  Meron Collins MD

## 2017-10-16 ENCOUNTER — TELEPHONE (OUTPATIENT)
Dept: FAMILY MEDICINE CLINIC | Age: 72
End: 2017-10-16

## 2017-10-16 NOTE — TELEPHONE ENCOUNTER
Records release sent to [facility] for notes from office visit last night.

## 2017-10-16 NOTE — TELEPHONE ENCOUNTER
Cris:  Please call Mr Lisa Keene about rash that he got from glue when he had a pacemaker put in. Went to Urgent care and has now had a rxn to the medication they gave him. Went to Er at 4am this morning.     [phone]

## 2017-11-01 RX ORDER — ATORVASTATIN CALCIUM 80 MG/1
80 TABLET, FILM COATED ORAL
Qty: 90 TAB | Refills: 4 | Status: SHIPPED | OUTPATIENT
Start: 2017-11-01 | End: 2018-04-20 | Stop reason: SDUPTHER

## 2017-11-03 RX ORDER — MELOXICAM 7.5 MG/1
TABLET ORAL
Qty: 90 TAB | Refills: 0 | Status: SHIPPED | OUTPATIENT
Start: 2017-11-03 | End: 2018-05-25

## 2017-11-07 ENCOUNTER — OFFICE VISIT (OUTPATIENT)
Dept: FAMILY MEDICINE CLINIC | Age: 72
End: 2017-11-07

## 2017-11-07 VITALS
OXYGEN SATURATION: 98 % | TEMPERATURE: 97.2 F | SYSTOLIC BLOOD PRESSURE: 132 MMHG | HEIGHT: 72 IN | BODY MASS INDEX: 32.51 KG/M2 | DIASTOLIC BLOOD PRESSURE: 78 MMHG | HEART RATE: 70 BPM | RESPIRATION RATE: 16 BRPM | WEIGHT: 240 LBS

## 2017-11-07 DIAGNOSIS — I48.91 ATRIAL FIBRILLATION, UNSPECIFIED TYPE (HCC): Primary | ICD-10-CM

## 2017-11-07 DIAGNOSIS — Z79.01 CURRENT USE OF LONG TERM ANTICOAGULATION: ICD-10-CM

## 2017-11-07 LAB
INR BLD: 2.3
PT POC: 27.1 SECONDS
VALID INTERNAL CONTROL?: YES

## 2017-12-06 ENCOUNTER — OFFICE VISIT (OUTPATIENT)
Dept: FAMILY MEDICINE CLINIC | Age: 72
End: 2017-12-06

## 2017-12-06 VITALS
RESPIRATION RATE: 18 BRPM | BODY MASS INDEX: 33.02 KG/M2 | DIASTOLIC BLOOD PRESSURE: 70 MMHG | TEMPERATURE: 97.2 F | WEIGHT: 243.8 LBS | HEART RATE: 82 BPM | SYSTOLIC BLOOD PRESSURE: 110 MMHG | HEIGHT: 72 IN

## 2017-12-06 DIAGNOSIS — Z79.01 CURRENT USE OF LONG TERM ANTICOAGULATION: ICD-10-CM

## 2017-12-06 DIAGNOSIS — I48.91 ATRIAL FIBRILLATION, UNSPECIFIED TYPE (HCC): Primary | ICD-10-CM

## 2017-12-06 LAB
INR BLD: 2
PT POC: 24.2 SECONDS
VALID INTERNAL CONTROL?: YES

## 2017-12-06 NOTE — PROGRESS NOTES
Subjective:     Devi Ash is a 81 y.o. male who presents today with the following:  Chief Complaint   Patient presents with   Richelle Castellanos enjoys fishing and spending time with his wife . Irl Read presents for anticoagulation management. Pacemaker checked nightly Jose Luis  Followed by Dr. Daniella George for cardiology    COMPLIANT WITH MEDICATION:   BMI:Discussed the patient's BMI with him. The BMI follow up plan is as follows:     dietary management education, guidance, and counseling  encourage exercise  monitor weight  prescribed dietary intake    An After Visit Summary was printed and given to the patient. Anticoagulation. Dx:  Atrial fibrillation. Current symptoms: none  Current control agent: none  Current anticoagulant: warfarin5 mg Sat, Sun, Tuesday and Thursday  History of bleeding problems: none  Lab Results   Component Value Date/Time    INR 1.5 10/05/2017 08:26 AM    INR POC 2.0 12/06/2017 07:25 AM    INR POC 2.3 11/07/2017 03:53 PM    INR POC 2.4 09/08/2017 08:15 AM    Prothrombin time 15.4 10/05/2017 08:26 AM         ROS:  Gen: denies fever, chills, fatigue, weight loss, weight gain  HEENT:denies blurry vision, nasal congestion, sore throat  Resp: denies dypsnea, cough, wheezing  CV: denies chest pain radiating to the jaws or arms, palpitations, lower extremity edema  Abd: denies nausea, vomiting, diarrhea, constipation  Neuro: denies numbness/tingling  Endo: denies polyuria, polydipsia, heat/cold intolerance  Heme: no lymphadenopathy     Allergies   Allergen Reactions    Tape [Adhesive] Rash and Angioedema    Morphine Unknown (comments)         Current Outpatient Prescriptions:     meloxicam (MOBIC) 7.5 mg tablet, TAKE 1 TABLET EVERY DAY AS NEEDED FOR SEVERE JOINT PAIN, Disp: 90 Tab, Rfl: 0    atorvastatin (LIPITOR) 80 mg tablet, Take 1 Tab by mouth nightly., Disp: 90 Tab, Rfl: 4    warfarin (COUMADIN) 5 mg tablet, Take 1 Tab by mouth daily.  Pt takes 1 tab Sat.,sun., tues. , and thurs. None on other days, Disp: 90 Tab, Rfl: 2    METHYLSULFONYLMETHANE (MSM PO), Take  by mouth., Disp: , Rfl:     multivitamin (ONE A DAY) tablet, Take 1 tablet by mouth daily. , Disp: , Rfl:     lidocaine (LIDODERM) 5 %(700 mg/patch), by TransDERmal route every twenty-four (24) hours. Apply patch to the affected area for 12 hours a day and remove for 12 hours a day., Disp: , Rfl:     aspirin 81 mg tablet, Take 81 mg by mouth., Disp: , Rfl:     fosinopril (MONOPRIL) 20 mg tablet, Take 20 mg by mouth daily. , Disp: , Rfl:     Past Medical History:   Diagnosis Date    Acute MI     Arthritis     Atrial fibrillation (HCC)     Congestive heart failure, unspecified     mild    Edema     Hypercholesterolemia     Hypertension     Unspecified vitamin D deficiency        Past Surgical History:   Procedure Laterality Date    HX ANGIOPLASTY      HX KNEE ARTHROSCOPY Right     HX PACEMAKER  10/2017    HX TONSILLECTOMY         History   Smoking Status    Former Smoker   Smokeless Tobacco    Never Used       Social History     Social History    Marital status:      Spouse name: N/A    Number of children: N/A    Years of education: N/A     Social History Main Topics    Smoking status: Former Smoker    Smokeless tobacco: Never Used    Alcohol use No    Drug use: None    Sexual activity: Not Asked     Other Topics Concern     Service Yes    Blood Transfusions No    Caffeine Concern No    Occupational Exposure No    Hobby Hazards Yes     fishing    Sleep Concern No    Stress Concern No    Weight Concern Yes     over weight    Special Diet No    Back Care Yes     pain    Exercise Yes    Bike Helmet Yes    Seat Belt Yes    Self-Exams Yes     Social History Narrative       Family History   Problem Relation Age of Onset    Lung Disease Mother     Cancer Mother      Lung--smoker    Heart Disease Father     Cancer Father      Prostate    No Known Problems Sister          Objective:     Visit Vitals    /70 (BP 1 Location: Left arm, BP Patient Position: Sitting)    Pulse 82    Temp 97.2 °F (36.2 °C) (Temporal)    Resp 18    Ht 6' (1.829 m)    Wt 243 lb 12.8 oz (110.6 kg)    BMI 33.07 kg/m2     Body mass index is 33.07 kg/(m^2). General: Alert and oriented. No acute distress. Well nourished  HENT :   Eyes: pupils equal, round, react to light and accommodation. Extra ocular movements intact. Nose: patent. Mouth and throat is clear. Neck:supple full range of motion no thyromegaly. Trachea midline, No carotid bruits. No significant lymphadenopathy  Lungs[de-identified] clear to auscultation without wheezes, rales, or rhonchi. Heart :RRR, S1 & S2 are normal intensity. No murmur; no gallop  Extremities: without clubbing, cyanosis, or edema  Pulses: radial  pulses are normal  Neuro: HMF intact. Cranial nerves II through XII grossly normal.      Results for orders placed or performed in visit on 12/06/17   AMB POC PT/INR   Result Value Ref Range    VALID INTERNAL CONTROL POC Yes     Prothrombin time (POC) 24.2 seconds    INR POC 2.0        Results for orders placed or performed in visit on 12/06/17   AMB POC PT/INR   Result Value Ref Range    VALID INTERNAL CONTROL POC Yes     Prothrombin time (POC) 24.2 seconds    INR POC 2.0        Assessment/ Plan:     Diagnoses and all orders for this visit:    1. Atrial fibrillation, unspecified type (HCC)  -     COLLECTION CAPILLARY BLOOD SPECIMEN  -     AMB POC PT/INR    2. Current use of long term anticoagulation  -     COLLECTION CAPILLARY BLOOD SPECIMEN  -     AMB POC PT/INR    3. BMI 33.0-33.9,adult         1. Atrial fibrillation, unspecified type ([address]    2. Current use of long term anticoagulation    3.  BMI 33.0-33.9,adult        Orders Placed This Encounter    COLLECTION CAPILLARY BLOOD SPECIMEN    AMB POC PT/INR         Verbal and written instructions (see AVS) provided.  Patient expresses understanding of diagnosis and treatment plan. Follow-up Disposition:  Return in about 1 month (around 1/6/2018).       IVAN Valladares

## 2017-12-06 NOTE — MR AVS SNAPSHOT
Visit Information Date & Time Provider Department Dept. Phone Encounter #  
 12/6/2017  7:30 AM Irene Izaguirre NP [facility] [address] [facility] [phone] [id number] Your Appointments 1/10/2018  7:30 AM  
ESTABLISHED PATIENT with Irene Izaguirre NP  
[address] ([facility]) Appt Note: pt/can't come on Friday [address] [address]  
[address] [facility] [address] [facility] [address] Upcoming Health Maintenance Date Due  
 GLAUCOMA SCREENING Q2Y 4/4/2012 FOBT Q 1 YEAR AGE 50-75 1/29/2017 Influenza Age 5 to Adult 8/1/2017 Pneumococcal 65+ Low/Medium Risk (2 of 2 - PPSV23) 2/3/2018 MEDICARE YEARLY EXAM 2/4/2018 DTaP/Tdap/Td series (2 - Td) 6/26/2027 Allergies as of 12/6/2017  Review Complete On: 12/6/2017 By: Seng Weston RN Severity Noted Reaction Type Reactions Tape [Adhesive] High 11/07/2017    Rash, Angioedema Morphine  05/24/2013    Unknown (comments) Current Immunizations  Never Reviewed Name Date Td, Adsorbed PF 6/26/2017 Not reviewed this visit You Were Diagnosed With   
  
 Codes Comments Atrial fibrillation, unspecified type ([facility] [address]    -  Primary ICD-10-CM: I48.91 
ICD-9-CM: 427.31 Current use of long term anticoagulation     ICD-10-CM: Z79.01 
ICD-9-CM: V58.61   
 BMI 33.0-33.9,adult     ICD-10-CM: W59.35 
ICD-9-CM: V85.33 Vitals BP Pulse Temp Resp  
 110/70 (BP 1 Location: Left arm, BP Patient Position: Sitting) 82 97.2 °F (36.2 °C) (Temporal) 18 Height(growth percentile) Weight(growth percentile) BMI Smoking Status 6' (1.829 m) 243 lb 12.8 oz (110.6 kg) 33.07 kg/m2 Former Smoker Vitals History BMI and BSA Data Body Mass Index Body Surface Area 33.07 kg/m 2 2.37 m 2 Preferred Pharmacy Pharmacy Name Phone [address] [place] [address] [phone] Your Updated Medication List  
  
   
This list is accurate as of: 12/6/17  7:43 AM.  Always use your most recent med list.  
  
  
  
  
 aspirin 81 mg tablet Take 81 mg by mouth. atorvastatin 80 mg tablet Commonly known as:  LIPITOR Take 1 Tab by mouth nightly. fosinopril 20 mg tablet Commonly known as:  MONOPRIL Take 20 mg by mouth daily. LIDODERM 5 % Generic drug:  lidocaine  
by TransDERmal route every twenty-four (24) hours. Apply patch to the affected area for 12 hours a day and remove for 12 hours a day. meloxicam 7.5 mg tablet Commonly known as:  MOBIC  
TAKE 1 TABLET EVERY DAY AS NEEDED FOR SEVERE JOINT PAIN  
  
 MSM PO Take  by mouth.  
  
 multivitamin tablet Commonly known as:  ONE A DAY Take 1 tablet by mouth daily. warfarin 5 mg tablet Commonly known as:  COUMADIN Take 1 Tab by mouth daily. Pt takes 1 tab Sat.,sun., tues. , and thurs. None on other days We Performed the Following AMB POC PT/INR [80658 CPT(R)] COLLECTION CAPILLARY BLOOD SPECIMEN [19529 CPT(R)] Introducing \[facility]\"" & HEALTH SERVICES! Dear Renee Truong: Thank you for requesting a BetUknow account. Our records indicate that you already have an active BetUknow account. You can access your account anytime at [email or web address] Did you know that you can access your hospital and ER discharge instructions at any time in BetUknow? You can also review all of your test results from your hospital stay or ER visit. Additional Information If you have questions, please visit the Frequently Asked Questions section of the BetUknow website at [email or web address] Remember, BetUknow is NOT to be used for urgent needs. For medical emergencies, dial 911. Now available from your iPhone and Android! Please provide this summary of care documentation to your next provider. Your primary care clinician is listed as Sterling Rosenthal. If you have any questions after today's visit, please call [phone].

## 2018-01-10 ENCOUNTER — OFFICE VISIT (OUTPATIENT)
Dept: FAMILY MEDICINE CLINIC | Age: 73
End: 2018-01-10

## 2018-01-10 VITALS
SYSTOLIC BLOOD PRESSURE: 122 MMHG | RESPIRATION RATE: 20 BRPM | HEIGHT: 72 IN | HEART RATE: 78 BPM | WEIGHT: 243 LBS | OXYGEN SATURATION: 98 % | BODY MASS INDEX: 32.91 KG/M2 | TEMPERATURE: 97 F | DIASTOLIC BLOOD PRESSURE: 70 MMHG

## 2018-01-10 DIAGNOSIS — Z79.01 CURRENT USE OF LONG TERM ANTICOAGULATION: ICD-10-CM

## 2018-01-10 DIAGNOSIS — I48.91 ATRIAL FIBRILLATION, UNSPECIFIED TYPE (HCC): Primary | ICD-10-CM

## 2018-01-10 LAB
INR BLD: 1.9
PT POC: 23.1 SECONDS
VALID INTERNAL CONTROL?: YES

## 2018-01-10 NOTE — PROGRESS NOTES
Subjective:     Larry Case is a 81 y.o. male who presents today with the following:  Chief Complaint   Patient presents with    Anticoagulation   Magdaleno Quiñones made some food choices that affected his PT and INR . Discussed avoiding green leafy vegetables. Started he will return to a diet that does not affect his PT and INR. COMPLIANT WITH MEDICATION:   HTN; Denies chest pain, dyspnea, palpitations, headache and blurred vision. Blood pressure normotensive. ROS:  Gen: denies fever, chills, fatigue, weight loss, weight gain  HEENT:denies blurry vision, nasal congestion, sore throat  Resp: denies dypsnea, cough, wheezing  CV: denies chest pain radiating to the jaws or arms, palpitations, lower extremity edema  Abd: denies nausea, vomiting, diarrhea, constipation  Neuro: denies numbness/tingling  Endo: denies polyuria, polydipsia, heat/cold intolerance  Heme: no lymphadenopathy    Anticoagulation. Dx:  Atrial fibrillation. Current symptoms: none  Current control agent: none  Current anticoagulant: warfarin 5 mg on Sat, Sunday Tuesdays and Thursdays. History of bleeding problems: none  Lab Results   Component Value Date/Time    INR 1.5 10/05/2017 08:26 AM    INR POC 1.9 01/10/2018 07:29 AM    INR POC 2.0 12/06/2017 07:25 AM    INR POC 2.3 11/07/2017 03:53 PM    Prothrombin time 15.4 10/05/2017 08:26 AM       Allergies   Allergen Reactions    Tape [Adhesive] Rash and Angioedema    Morphine Unknown (comments)         Current Outpatient Prescriptions:     meloxicam (MOBIC) 7.5 mg tablet, TAKE 1 TABLET EVERY DAY AS NEEDED FOR SEVERE JOINT PAIN, Disp: 90 Tab, Rfl: 0    atorvastatin (LIPITOR) 80 mg tablet, Take 1 Tab by mouth nightly., Disp: 90 Tab, Rfl: 4    warfarin (COUMADIN) 5 mg tablet, Take 1 Tab by mouth daily. Pt takes 1 tab Sat.,sun., tues. , and thurs.  None on other days, Disp: 90 Tab, Rfl: 2    METHYLSULFONYLMETHANE (MSM PO), Take  by mouth., Disp: , Rfl:     multivitamin (ONE A DAY) tablet, Take 1 tablet by mouth daily. , Disp: , Rfl:     lidocaine (LIDODERM) 5 %(700 mg/patch), by TransDERmal route every twenty-four (24) hours. Apply patch to the affected area for 12 hours a day and remove for 12 hours a day., Disp: , Rfl:     aspirin 81 mg tablet, Take 81 mg by mouth., Disp: , Rfl:     fosinopril (MONOPRIL) 20 mg tablet, Take 20 mg by mouth daily. , Disp: , Rfl:     Past Medical History:   Diagnosis Date    Acute MI     Arthritis     Atrial fibrillation ([facility] [address]     Congestive heart failure, unspecified     mild    Edema     Hypercholesterolemia     Hypertension     Unspecified vitamin D deficiency        Past Surgical History:   Procedure Laterality Date    HX ANGIOPLASTY      HX KNEE ARTHROSCOPY Right     HX PACEMAKER  10/2017    HX TONSILLECTOMY         History   Smoking Status    Former Smoker   Smokeless Tobacco    Never Used       Social History     Social History    Marital status:      Spouse name: N/A    Number of children: N/A    Years of education: N/A     Social History Main Topics    Smoking status: Former Smoker    Smokeless tobacco: Never Used    Alcohol use No    Drug use: None    Sexual activity: Not Asked     Other Topics Concern     Service Yes    Blood Transfusions No    Caffeine Concern No    Occupational Exposure No    Hobby Hazards Yes     fishing    Sleep Concern No    Stress Concern No    Weight Concern Yes     over weight    Special Diet No    Back Care Yes     pain    Exercise Yes    Bike Helmet Yes    Seat Belt Yes    Self-Exams Yes     Social History Narrative       Family History   Problem Relation Age of Onset    Lung Disease Mother     Cancer Mother      Lung--smoker    Heart Disease Father     Cancer Father      Prostate    No Known Problems Sister          Objective:     Visit Vitals    /70 (BP 1 Location: Left arm, BP Patient Position: Sitting)    Pulse 78    Temp 97 °F (36.1 °C) (Temporal)    Resp 20  Ht 6' (1.829 m)    Wt 243 lb (110.2 kg)    SpO2 98%    BMI 32.96 kg/m2     Body mass index is 32.96 kg/(m^2). General: Alert and oriented. No acute distress. Well nourished  HENT :   Eyes: pupils equal, round, react to light and accommodation. Nose: patent. Mouth and throat is clear. Neck:supple full range of motion no thyromegaly. Trachea midline, No carotid bruits. No significant lymphadenopathy  Lungs[de-identified] clear to auscultation without wheezes, rales, or rhonchi. Heart :RRR, S1 & S2 are normal intensity. No murmur; no gallop      Results for orders placed or performed in visit on 01/10/18   AMB POC PT/INR   Result Value Ref Range    VALID INTERNAL CONTROL POC Yes     Prothrombin time (POC) 23.1 seconds    INR POC 1.9        Results for orders placed or performed in visit on 01/10/18   AMB POC PT/INR   Result Value Ref Range    VALID INTERNAL CONTROL POC Yes     Prothrombin time (POC) 23.1 seconds    INR POC 1.9        Assessment/ Plan:     Diagnoses and all orders for this visit:    1. Atrial fibrillation, unspecified type (HCC)  -     COLLECTION CAPILLARY BLOOD SPECIMEN  -     AMB POC PT/INR    2. Current use of long term anticoagulation  -     COLLECTION CAPILLARY BLOOD SPECIMEN  -     AMB POC PT/INR         1. Atrial fibrillation, unspecified type ([address]    2. Current use of long term anticoagulation        Orders Placed This Encounter    COLLECTION CAPILLARY BLOOD SPECIMEN    AMB POC PT/INR         Verbal and written instructions (see AVS) provided.  Patient expresses understanding of diagnosis and treatment plan. Follow-up Disposition:  Return in about 1 month (around 2/10/2018).       IVAN Ross

## 2018-01-10 NOTE — MR AVS SNAPSHOT
Visit Information Date & Time Provider Department Dept. Phone Encounter #  
 1/10/2018  7:30 AM Sanchez Snowden NP [place] [id number] [facility] [phone] [id number] Follow-up Instructions Return in about 1 month (around 2/10/2018). Follow-up and Disposition History Upcoming Health Maintenance Date Due  
 GLAUCOMA SCREENING Q2Y 4/4/2012 FOBT Q 1 YEAR AGE 50-75 1/29/2017 Influenza Age 5 to Adult 8/1/2017 Pneumococcal 65+ Low/Medium Risk (2 of 2 - PPSV23) 2/3/2018 MEDICARE YEARLY EXAM 2/4/2018 DTaP/Tdap/Td series (2 - Td) 6/26/2027 Allergies as of 1/10/2018  Review Complete On: 1/10/2018 By: Sanchez Snowden NP Severity Noted Reaction Type Reactions Tape [Adhesive] High 11/07/2017    Rash, Angioedema Morphine  05/24/2013    Unknown (comments) Current Immunizations  Never Reviewed Name Date Td, Adsorbed PF 6/26/2017 Not reviewed this visit You Were Diagnosed With   
  
 Codes Comments At[facility] fibrillation, unspecified type ([facility]    -  Primary ICD-10-CM: I48.91 
ICD-9-CM: 427.31 Current use of long term anticoagulation     ICD-10-CM: Z79.01 
ICD-9-CM: V58.61 Vitals BP Pulse Temp Resp Height(growth percentile) Weight(growth percentile) 122/70 (BP 1 Location: Left arm, BP Patient Position: Sitting) 78 97 °F (36.1 °C) (Temporal) 20 6' (1.829 m) 243 lb (110.2 kg) SpO2 BMI Smoking Status 98% 32.96 kg/m2 Former Smoker Vitals History BMI and BSA Data Body Mass Index Body Surface Area  
 32.96 kg/m 2 2.37 m 2 Preferred Pharmacy Pharmacy Name Phone [address] [facility] [address] [phone] Your Updated Medication List  
  
   
This list is accurate as of: 1/10/18  8:03 AM.  Always use your most recent med list.  
  
  
  
  
 aspirin 81 mg tablet Take 81 mg by mouth. atorvastatin 80 mg tablet Commonly known as:  LIPITOR Take 1 Tab by mouth nightly. fosinopril 20 mg tablet Commonly known as:  MONOPRIL Take 20 mg by mouth daily. LIDODERM 5 % Generic drug:  lidocaine  
by TransDERmal route every twenty-four (24) hours. Apply patch to the affected area for 12 hours a day and remove for 12 hours a day. meloxicam 7.5 mg tablet Commonly known as:  MOBIC  
TAKE 1 TABLET EVERY DAY AS NEEDED FOR SEVERE JOINT PAIN  
  
 MSM PO Take  by mouth.  
  
 multivitamin tablet Commonly known as:  ONE A DAY Take 1 tablet by mouth daily. warfarin 5 mg tablet Commonly known as:  COUMADIN Take 1 Tab by mouth daily. Pt takes 1 tab Sat.,sun., tues. , and thurs. None on other days We Performed the Following AMB POC PT/INR [77142 CPT(R)] COLLECTION CAPILLARY BLOOD SPECIMEN [93388 CPT(R)] Follow-up Instructions Return in about 1 month (around 2/10/2018). Introducing [facility] & HEALTH SERVICES! Dear Makayla Puckett: Thank you for requesting a TrenDemon account. Our records indicate that you already have an active TrenDemon account. You can access your account anytime at [email or web address] Did you know that you can access your hospital and ER discharge instructions at any time in TrenDemon? You can also review all of your test results from your hospital stay or ER visit. Additional Information If you have questions, please visit the Frequently Asked Questions section of the TrenDemon website at [email or web address] Remember, TrenDemon is NOT to be used for urgent needs. For medical emergencies, dial 911. Now available from your iPhone and Android! Please provide this summary of care documentation to your next provider. Your primary care clinician is listed as Dorota Poole. If you have any questions after today's visit, please call [phone].

## 2018-02-09 ENCOUNTER — OFFICE VISIT (OUTPATIENT)
Dept: FAMILY MEDICINE CLINIC | Age: 73
End: 2018-02-09

## 2018-02-09 VITALS
BODY MASS INDEX: 32.64 KG/M2 | DIASTOLIC BLOOD PRESSURE: 88 MMHG | OXYGEN SATURATION: 98 % | SYSTOLIC BLOOD PRESSURE: 146 MMHG | HEIGHT: 72 IN | TEMPERATURE: 98 F | RESPIRATION RATE: 16 BRPM | WEIGHT: 241 LBS | HEART RATE: 73 BPM

## 2018-02-09 DIAGNOSIS — Z79.01 CURRENT USE OF LONG TERM ANTICOAGULATION: ICD-10-CM

## 2018-02-09 DIAGNOSIS — I48.91 ATRIAL FIBRILLATION, UNSPECIFIED TYPE (HCC): Primary | ICD-10-CM

## 2018-02-09 LAB
INR BLD: 1.6
PT POC: 18.7 SECONDS
VALID INTERNAL CONTROL?: YES

## 2018-02-09 NOTE — PROGRESS NOTES
Subjective:     Ed Botello is a 81 y.o. male who presents today with the following:  Chief Complaint   Patient presents with    Coagulation disorder   Request to initiate home PT and INR  Forms initiated and faxed. Discussed Motrin versus Meloxicam for pain. Took Motrin 2 years ago seems to have better relief. COMPLIANT WITH MEDICATION:   HTN; Denies chest pain, dyspnea, palpitations, headache and blurred vision. Blood pressure normotensive. Anticoagulation. Dx: Atrial fibrillation. Current symptoms: none  Current control agent: none  Current anticoagulant: warfarin5 mg on Sunday Tuesday and Thursday and 7.5 mg on Saturday  History of bleeding problems: none  Lab Results   Component Value Date/Time    INR 1.5 (H) 10/05/2017 08:26 AM    INR POC 1.6 02/09/2018 07:27 AM    INR POC 1.9 01/10/2018 07:29 AM    INR POC 2.0 12/06/2017 07:25 AM    Prothrombin time 15.4 (H) 10/05/2017 08:26 AM         ROS:  Gen: denies fever, chills, fatigue, weight loss, weight gain  HEENT:denies blurry vision, nasal congestion, sore throat  Resp: denies dypsnea, cough, wheezing  CV: denies chest pain radiating to the jaws or arms, palpitations, lower extremity edema  Abd: denies nausea, vomiting, diarrhea, constipation  Neuro: denies numbness/tingling  Endo: denies polyuria, polydipsia, heat/cold intolerance  Heme: no lymphadenopathy    Allergies   Allergen Reactions    Tape [Adhesive] Rash and Angioedema    Morphine Unknown (comments)         Current Outpatient Prescriptions:     meloxicam (MOBIC) 7.5 mg tablet, TAKE 1 TABLET EVERY DAY AS NEEDED FOR SEVERE JOINT PAIN, Disp: 90 Tab, Rfl: 0    atorvastatin (LIPITOR) 80 mg tablet, Take 1 Tab by mouth nightly., Disp: 90 Tab, Rfl: 4    warfarin (COUMADIN) 5 mg tablet, Take 1 Tab by mouth daily. Pt takes 1 tab Sat.,sun., tues. , and thurs.  None on other days, Disp: 90 Tab, Rfl: 2    METHYLSULFONYLMETHANE (MSM PO), Take  by mouth., Disp: , Rfl:     multivitamin (ONE A DAY) tablet, Take 1 tablet by mouth daily. , Disp: , Rfl:     lidocaine (LIDODERM) 5 %(700 mg/patch), by TransDERmal route every twenty-four (24) hours. Apply patch to the affected area for 12 hours a day and remove for 12 hours a day., Disp: , Rfl:     aspirin 81 mg tablet, Take 81 mg by mouth., Disp: , Rfl:     fosinopril (MONOPRIL) 20 mg tablet, Take 20 mg by mouth daily. , Disp: , Rfl:     ibuprofen (MOTRIN) 800 mg tablet, Take 1 Tab by mouth every morning., Disp: 90 Tab, Rfl: 0    Past Medical History:   Diagnosis Date    Acute MI     Arthritis     Atrial fibrillation (HCC)     Congestive heart failure, unspecified     mild    Edema     Hypercholesterolemia     Hypertension     Unspecified vitamin D deficiency        Past Surgical History:   Procedure Laterality Date    HX ANGIOPLASTY      HX KNEE ARTHROSCOPY Right     HX PACEMAKER  10/2017    HX TONSILLECTOMY         History   Smoking Status    Former Smoker   Smokeless Tobacco    Never Used       Social History     Social History    Marital status:      Spouse name: N/A    Number of children: N/A    Years of education: N/A     Social History Main Topics    Smoking status: Former Smoker    Smokeless tobacco: Never Used    Alcohol use No    Drug use: None    Sexual activity: Not Asked     Other Topics Concern     Service Yes    Blood Transfusions No    Caffeine Concern No    Occupational Exposure No    Hobby Hazards Yes     fishing    Sleep Concern No    Stress Concern No    Weight Concern Yes     over weight    Special Diet No    Back Care Yes     pain    Exercise Yes    Bike Helmet Yes    Seat Belt Yes    Self-Exams Yes     Social History Narrative       Family History   Problem Relation Age of Onset    Lung Disease Mother     Cancer Mother      Lung--smoker    Heart Disease Father     Cancer Father      Prostate    No Known Problems Sister          Objective:     Visit Vitals    /88 (BP 1 Location: Right arm, BP Patient Position: Sitting)    Pulse 73    Temp 98 °F (36.7 °C) (Temporal)    Resp 16    Ht 6' (1.829 m)    Wt 241 lb (109.3 kg)    SpO2 98%    BMI 32.69 kg/m2     Body mass index is 32.69 kg/(m^2). General: Alert and oriented. No acute distress. Well nourished  HEENT :  Eyes: pupils equal, round, react to light and accommodation. Nose: patent. Mouth and throat is clear. Neck:supple full range of motion no thyromegaly. Trachea midline, No carotid bruits. No significant lymphadenopathy  Lungs[de-identified] clear to auscultation without wheezes, rales, or rhonchi. Heart :RRR, S1 & S2 are normal intensity. No murmur; no gallop  Extremities: without clubbing, cyanosis, or edema  Pulses: radial and femoral pulses are normal  Neuro: HMF intact. Cranial nerves II through XII grossly normal.  Deep tendon reflexes: +2 equal    Results for orders placed or performed in visit on 02/09/18   AMB POC PT/INR   Result Value Ref Range    VALID INTERNAL CONTROL POC Yes     Prothrombin time (POC) 18.7 seconds    INR POC 1.6        Results for orders placed or performed in visit on 02/09/18   AMB POC PT/INR   Result Value Ref Range    VALID INTERNAL CONTROL POC Yes     Prothrombin time (POC) 18.7 seconds    INR POC 1.6        Assessment/ Plan:     Diagnoses and all orders for this visit:    1. Atrial fibrillation, unspecified type (HCC)  -     AMB POC PT/INR  -     COLLECTION CAPILLARY BLOOD SPECIMEN    2. Current use of long term anticoagulation  -     AMB POC PT/INR  -     COLLECTION CAPILLARY BLOOD SPECIMEN         1. Atrial fibrillation, unspecified type ([address]    2. Current use of long term anticoagulation        Orders Placed This Encounter    COLLECTION CAPILLARY BLOOD SPECIMEN    AMB POC PT/INR         Verbal and written instructions (see AVS) provided.  Patient expresses understanding of diagnosis and treatment plan. Follow-up Disposition:  Return in about 2 weeks (around 2/23/2018).       Tana Reinoso Arnol Carr, FNP-C

## 2018-02-09 NOTE — MR AVS SNAPSHOT
[address] [facility] 
 
 
 [address] 
[phone] Patient: Orinda Severin MRN: [id number] Visit Information Date & Time Provider Department Dept. Phone Encounter #  
 2/9/2018  7:30 AM Claire Arias  [place] [phone] [id number] Your Appointments 3/9/2018  7:45 AM  
ESTABLISHED PATIENT with Claire Arias NP  
[address] [place] [facility] Appt Note: PT  
 [address] [facility] [id number]  
[address] [facility] [address] [address] Upcoming Health Maintenance Date Due  
 GLAUCOMA SCREENING Q2Y 4/4/2012 FOBT Q 1 YEAR AGE 50-75 1/29/2017 Influenza Age 5 to Adult 8/1/2017 Pneumococcal 65+ Low/Medium Risk (2 of 2 - PPSV23) 2/3/2018 MEDICARE YEARLY EXAM 2/4/2018 DTaP/Tdap/Td series (2 - Td) 6/26/2027 Allergies as of 2/9/2018  Review Complete On: 2/9/2018 By: Sonya Simons LPN Severity Noted Reaction Type Reactions Tape [Adhesive] High 11/07/2017    Rash, Angioedema Morphine  05/24/2013    Unknown (comments) Current Immunizations  Never Reviewed Name Date Td, Adsorbed PF 6/26/2017 Not reviewed this visit You Were Diagnosed With   
  
 Codes Comments At[facility] fibrillation, unspecified type ([facility]    -  Primary ICD-10-CM: I48.91 
ICD-9-CM: 427.31 Current use of long term anticoagulation     ICD-10-CM: Z79.01 
ICD-9-CM: V58.61 Vitals BP Pulse Temp Resp Height(growth percentile) Weight(growth percentile) 146/88 (BP 1 Location: Right arm, BP Patient Position: Sitting) 73 98 °F (36.7 °C) (Temporal) 16 6' (1.829 m) 241 lb (109.3 kg) SpO2 BMI Smoking Status 98% 32.69 kg/m2 Former Smoker BMI and BSA Data Body Mass Index Body Surface Area  
 32.69 kg/m 2 2.36 m 2 Preferred Pharmacy Pharmacy Name Phone [facility] [place], [place] - [address] [facility] [address] [phone] Your Updated Medication List  
  
   
This list is accurate as of: 2/9/18  8:03 AM.  Always use your most recent med list.  
  
  
  
  
 aspirin 81 mg tablet Take 81 mg by mouth. atorvastatin 80 mg tablet Commonly known as:  LIPITOR Take 1 Tab by mouth nightly. fosinopril 20 mg tablet Commonly known as:  MONOPRIL Take 20 mg by mouth daily. ibuprofen 800 mg tablet Commonly known as:  MOTRIN Take 1 Tab by mouth every morning. LIDODERM 5 % Generic drug:  lidocaine  
by TransDERmal route every twenty-four (24) hours. Apply patch to the affected area for 12 hours a day and remove for 12 hours a day. meloxicam 7.5 mg tablet Commonly known as:  MOBIC  
TAKE 1 TABLET EVERY DAY AS NEEDED FOR SEVERE JOINT PAIN  
  
 MSM PO Take  by mouth.  
  
 multivitamin tablet Commonly known as:  ONE A DAY Take 1 tablet by mouth daily. warfarin 5 mg tablet Commonly known as:  COUMADIN Take 1 Tab by mouth daily. Pt takes 1 tab Sat.,sun., tues. , and thurs. None on other days We Performed the Following AMB POC PT/INR [05300 CPT(R)] COLLECTION CAPILLARY BLOOD SPECIMEN [19705 CPT(R)] Introducing [address]! Dear Rosa Funk: Thank you for requesting a ProTip account. Our records indicate that you already have an active ProTip account. You can access your account anytime at [email or web address] Did you know that you can access your hospital and ER discharge instructions at any time in ProTip? You can also review all of your test results from your hospital stay or ER visit. Additional Information If you have questions, please visit the Frequently Asked Questions section of the ProTip website at [email or web address] Remember, ProTip is NOT to be used for urgent needs.  For medical emergencies, dial 911. Now available from your iPhone and Android! Please provide this summary of care documentation to your next provider. Your primary care clinician is listed as Julieta Dorsey. If you have any questions after today's visit, please call [phone].

## 2018-03-09 ENCOUNTER — OFFICE VISIT (OUTPATIENT)
Dept: FAMILY MEDICINE CLINIC | Age: 73
End: 2018-03-09

## 2018-03-09 VITALS
OXYGEN SATURATION: 95 % | RESPIRATION RATE: 16 BRPM | BODY MASS INDEX: 32.64 KG/M2 | WEIGHT: 241 LBS | SYSTOLIC BLOOD PRESSURE: 120 MMHG | HEART RATE: 70 BPM | HEIGHT: 72 IN | TEMPERATURE: 97.3 F | DIASTOLIC BLOOD PRESSURE: 78 MMHG

## 2018-03-09 DIAGNOSIS — I48.91 ATRIAL FIBRILLATION, UNSPECIFIED TYPE (HCC): Primary | ICD-10-CM

## 2018-03-09 LAB
INR BLD: 2 (ref 2–3)
PT POC: 24 SECONDS (ref 10.4–14)
VALID INTERNAL CONTROL?: YES

## 2018-03-09 NOTE — PROGRESS NOTES
Indication: Atrial Fibrillation  Current dose:  Coumadin 5 mg. . 1 1/2 tab Sat, and 1 tab Sun, Tues and Thurs. Missed Coumadin Doses:  None  Medication Changes:  no  Dietary Changes:  no    Symptoms: taking coumadin appropriately without any bleeding.     Results for orders placed or performed in visit on 03/09/18   AMB POC PT/INR   Result Value Ref Range    VALID INTERNAL CONTROL POC Yes     Prothrombin time (POC) 24.0 (A) 10.4 - 14 seconds    INR POC 2.0 2 - 3

## 2018-03-09 NOTE — MR AVS SNAPSHOT
[address] [facility] [address] 
 
 
 [address] 
[phone] Patient: August Barkley MRN: [id number] YZZ:[birth year] Visit Information Date & Time Provider Department Dept. Phone Encounter #  
 3/9/2018  7:45 AM Doyle Yañez NP [facility] [address] [facility] [phone] [id number] Follow-up Instructions Return in about 5 weeks (around 4/13/2018). Your Appointments 4/20/2018  7:30 AM  
ESTABLISHED PATIENT with Doyle Yañez NP  
[address] ([facility]) Appt Note: OV for P.T./INR  
 [address] [address]  
[address] [facility] [address] [address] Upcoming Health Maintenance Date Due  
 GLAUCOMA SCREENING Q2Y 4/4/2012 Bone Densitometry (Dexa) Screening 4/4/2012 FOBT Q 1 YEAR AGE 50-75 1/29/2017 MEDICARE YEARLY EXAM 2/10/2019 DTaP/Tdap/Td series (2 - Td) 6/26/2027 Allergies as of 3/9/2018  Review Complete On: 3/9/2018 By: Doyle Yañez NP Severity Noted Reaction Type Reactions Tape [Adhesive] High 11/07/2017    Rash, Angioedema Morphine  05/24/2013    Unknown (comments) Current Immunizations  Never Reviewed Name Date Td, Adsorbed PF 6/26/2017 Not reviewed this visit You Were Diagnosed With   
  
 Codes Comments Atrial fibrillation, unspecified type ([facility]    -  Primary ICD-10-CM: I48.91 
ICD-9-CM: 427.31 Vitals BP Pulse Temp Resp Height(growth percentile) 120/78 (BP 1 Location: Right arm, BP Patient Position: Sitting) 70 97.3 °F (36.3 °C) (Temporal) 16 6' (1.829 m) Weight(growth percentile) SpO2 BMI Smoking Status 241 lb (109.3 kg) 95% 32.69 kg/m2 Former Smoker BMI and BSA Data Body Mass Index Body Surface Area  
 32.69 kg/m 2 2.36 m 2 Preferred Pharmacy Pharmacy Name Phone [address] [phone] Your Updated Medication List  
  
   
This list is accurate as of 3/9/18  8:16 AM.  Always use your most recent med list.  
  
  
  
  
 aspirin 81 mg tablet Take 81 mg by mouth. atorvastatin 80 mg tablet Commonly known as:  LIPITOR Take 1 Tab by mouth nightly. fosinopril 20 mg tablet Commonly known as:  MONOPRIL Take 20 mg by mouth daily. LIDODERM 5 % Generic drug:  lidocaine  
by TransDERmal route every twenty-four (24) hours. Apply patch to the affected area for 12 hours a day and remove for 12 hours a day. meloxicam 7.5 mg tablet Commonly known as:  MOBIC  
TAKE 1 TABLET EVERY DAY AS NEEDED FOR SEVERE JOINT PAIN  
  
 MSM PO Take  by mouth.  
  
 multivitamin tablet Commonly known as:  ONE A DAY Take 1 tablet by mouth daily. warfarin 5 mg tablet Commonly known as:  COUMADIN Take 1 Tab by mouth daily. Pt takes 1 tab Sat.,sun., tues. , and thurs. None on other days We Performed the Following AMB POC PT/INR [76593 CPT(R)] COLLECTION CAPILLARY BLOOD SPECIMEN [09987 CPT(R)] Follow-up Instructions Return in about 5 weeks (around 4/13/2018). Introducing [facility]! Dear Adenike Lin: Thank you for requesting a [employer] account. Our records indicate that you already have an active [employer] account. You can access your account anytime at [email or web address] Did you know that you can access your hospital and ER discharge instructions at any time in [employer]? You can also review all of your test results from your hospital stay or ER visit. Additional Information If you have questions, please visit the Frequently Asked Questions section of the [employer] website at [email or web address] Remember, [employer] is NOT to be used for urgent needs. For medical emergencies, dial 911. Now available from your iPhone and Android! Please provide this summary of care documentation to your next provider. Your primary care clinician is listed as Doyle Yañez. If you have any questions after today's visit, please call [phone].

## 2018-03-09 NOTE — ACP (ADVANCE CARE PLANNING)
Discussed importance of advanced medical directives with patient. Patient is capable of making decisions.   Amber Diop NP-C

## 2018-03-09 NOTE — PROGRESS NOTES
Subjective:     Skyler Sharpe is a 81 y.o. male who presents today with the following:  Chief Complaint   Patient presents with    Anticoagulation   Returned from a vacation in [place]. Patient Active Problem List   Diagnosis Code    CAD (coronary artery disease) I25.10    Atrial fibrillation ([facility] I48.91    Hyperlipidemia E78.5    Hypertension I10    Congestive heart failure (HCC) I50.9    Current use of long term anticoagulation Z79.01         COMPLIANT WITH MEDICATION:     Anticoagulation. Dx:  Atrial fibrillation. Current symptoms: none  Current control agent: B-blocker  Current anticoagulant: warfarin5 mg on Sat, sun, Tues and thursday only  History of bleeding problems: none  Lab Results   Component Value Date/Time    INR 1.5 (H) 10/05/2017 08:26 AM    INR POC 2.0 03/09/2018 07:54 AM    INR POC 1.6 02/09/2018 07:27 AM    INR POC 1.9 01/10/2018 07:29 AM    Prothrombin time 15.4 (H) 10/05/2017 08:26 AM         ROS:  Gen: denies fever, chills, fatigue, weight loss, weight gain  HEENT:denies blurry vision, nasal congestion, sore throat  Resp: denies dypsnea, cough, wheezing  CV: denies chest pain radiating to the jaws or arms, palpitations, lower extremity edema  Abd: denies nausea, vomiting, diarrhea, constipation  Neuro: denies numbness/tingling  Endo: denies polyuria, polydipsia, heat/cold intolerance  Heme: no lymphadenopathy    Allergies   Allergen Reactions    Tape [Adhesive] Rash and Angioedema    Morphine Unknown (comments)         Current Outpatient Prescriptions:     meloxicam (MOBIC) 7.5 mg tablet, TAKE 1 TABLET EVERY DAY AS NEEDED FOR SEVERE JOINT PAIN, Disp: 90 Tab, Rfl: 0    atorvastatin (LIPITOR) 80 mg tablet, Take 1 Tab by mouth nightly., Disp: 90 Tab, Rfl: 4    warfarin (COUMADIN) 5 mg tablet, Take 1 Tab by mouth daily. Pt takes 1 tab Sat.,sun., tues. , and thurs. None on other days (Patient taking differently: Take 5 mg by mouth daily.  Pt takes 1 1/2  tab Sat., and 1 tab sun., tues., and thurs. None on other days), Disp: 90 Tab, Rfl: 2    METHYLSULFONYLMETHANE (MSM PO), Take  by mouth., Disp: , Rfl:     multivitamin (ONE A DAY) tablet, Take 1 tablet by mouth daily. , Disp: , Rfl:     lidocaine (LIDODERM) 5 %(700 mg/patch), by TransDERmal route every twenty-four (24) hours. Apply patch to the affected area for 12 hours a day and remove for 12 hours a day., Disp: , Rfl:     aspirin 81 mg tablet, Take 81 mg by mouth., Disp: , Rfl:     fosinopril (MONOPRIL) 20 mg tablet, Take 20 mg by mouth daily. , Disp: , Rfl:     Past Medical History:   Diagnosis Date    Acute MI     Arthritis     Atrial fibrillation ([facility]     Congestive heart failure, unspecified     mild    Edema     Hypercholesterolemia     Hypertension     Unspecified vitamin D deficiency        Past Surgical History:   Procedure Laterality Date    HX ANGIOPLASTY      HX KNEE ARTHROSCOPY Right     HX PACEMAKER  10/2017    HX TONSILLECTOMY         History   Smoking Status    Former Smoker   Smokeless Tobacco    Never Used       Social History     Social History    Marital status:      Spouse name: N/A    Number of children: N/A    Years of education: N/A     Social History Main Topics    Smoking status: Former Smoker    Smokeless tobacco: Never Used    Alcohol use No    Drug use: None    Sexual activity: Not Asked     Other Topics Concern     Service Yes    Blood Transfusions No    Caffeine Concern No    Occupational Exposure No    Hobby Hazards Yes     fishing    Sleep Concern No    Stress Concern No    Weight Concern Yes     over weight    Special Diet No    Back Care Yes     pain    Exercise Yes    Bike Helmet Yes    Seat Belt Yes    Self-Exams Yes     Social History Narrative       Family History   Problem Relation Age of Onset    Lung Disease Mother     Cancer Mother      Lung--smoker    Heart Disease Father     Cancer Father      Prostate    No Known Problems Sister Objective:     Visit Vitals    /78 (BP 1 Location: Right arm, BP Patient Position: Sitting)    Pulse 70    Temp 97.3 °F (36.3 °C) (Temporal)    Resp 16    Ht 6' (1.829 m)    Wt 241 lb (109.3 kg)    SpO2 95%    BMI 32.69 kg/m2     Body mass index is 32.69 kg/(m^2). General: Alert and oriented. No acute distress. Well nourished  HEENT :  Eyes: pupils equal, round, react to light and accommodation. Nose: patent. Mouth and throat is clear. Neck:supple full range of motion no thyromegaly. Trachea midline, No carotid bruits. No significant lymphadenopathy  Lungs[de-identified] clear to auscultation without wheezes, rales, or rhonchi. Heart :RRR, S1 & S2 are normal intensity. No murmur; no gallop  Extremities: without clubbing, cyanosis, or edema  Pulses: radial and femoral pulses are normal  Neuro: HMF intact. Cranial nerves II through XII grossly normal.    Results for orders placed or performed in visit on 03/09/18   AMB POC PT/INR   Result Value Ref Range    VALID INTERNAL CONTROL POC Yes     Prothrombin time (POC) 24.0 (A) 10.4 - 14 seconds    INR POC 2.0 2 - 3       Results for orders placed or performed in visit on 03/09/18   AMB POC PT/INR   Result Value Ref Range    VALID INTERNAL CONTROL POC Yes     Prothrombin time (POC) 24.0 (A) 10.4 - 14 seconds    INR POC 2.0 2 - 3       Assessment/ Plan:     1. Atrial fibrillation, unspecified type ([facility]    - COLLECTION CAPILLARY BLOOD SPECIMEN  - AMB POC PT/INR      Orders Placed This Encounter    COLLECTION CAPILLARY BLOOD SPECIMEN    AMB POC PT/INR         Verbal and written instructions (see AVS) provided.  Patient expresses understanding of diagnosis and treatment plan. Health Maintenance Due   Topic Date Due    GLAUCOMA SCREENING Q2Y  04/04/2012    Bone Densitometry (Dexa) Screening  04/04/2012    FOBT Q 1 YEAR AGE 50-75  01/29/2017         Follow-up Disposition:  Return in about 5 weeks (around 4/13/2018).       IVAN Pacheco

## 2018-04-20 ENCOUNTER — OFFICE VISIT (OUTPATIENT)
Dept: FAMILY MEDICINE CLINIC | Age: 73
End: 2018-04-20

## 2018-04-20 VITALS
SYSTOLIC BLOOD PRESSURE: 128 MMHG | HEART RATE: 86 BPM | OXYGEN SATURATION: 99 % | DIASTOLIC BLOOD PRESSURE: 78 MMHG | TEMPERATURE: 96 F | RESPIRATION RATE: 20 BRPM | WEIGHT: 243.6 LBS | BODY MASS INDEX: 33 KG/M2 | HEIGHT: 72 IN

## 2018-04-20 DIAGNOSIS — E78.5 HYPERLIPIDEMIA, UNSPECIFIED HYPERLIPIDEMIA TYPE: ICD-10-CM

## 2018-04-20 DIAGNOSIS — I48.20 CHRONIC ATRIAL FIBRILLATION (HCC): ICD-10-CM

## 2018-04-20 DIAGNOSIS — Z79.01 CURRENT USE OF LONG TERM ANTICOAGULATION: ICD-10-CM

## 2018-04-20 DIAGNOSIS — R35.1 NOCTURIA: ICD-10-CM

## 2018-04-20 DIAGNOSIS — I10 ESSENTIAL HYPERTENSION: Primary | ICD-10-CM

## 2018-04-20 LAB
INR BLD: 2.1 (ref 2–3)
PT POC: 24.9 SECONDS (ref 10.4–14)
VALID INTERNAL CONTROL?: YES

## 2018-04-20 RX ORDER — IBUPROFEN 800 MG/1
TABLET ORAL
Qty: 180 TAB | Refills: 1 | Status: SHIPPED | OUTPATIENT
Start: 2018-04-20 | End: 2018-09-21 | Stop reason: SDUPTHER

## 2018-04-20 RX ORDER — WARFARIN SODIUM 5 MG/1
5 TABLET ORAL DAILY
Qty: 90 TAB | Refills: 3 | Status: SHIPPED | OUTPATIENT
Start: 2018-04-20 | End: 2018-05-25 | Stop reason: SDUPTHER

## 2018-04-20 RX ORDER — ATORVASTATIN CALCIUM 80 MG/1
80 TABLET, FILM COATED ORAL
Qty: 90 TAB | Refills: 4 | Status: SHIPPED | OUTPATIENT
Start: 2018-04-20 | End: 2019-04-05 | Stop reason: SDUPTHER

## 2018-04-20 NOTE — PROGRESS NOTES
Anticoagulation Episode Summary     Current INR goal     Next INR check 12/30/2016    INR from last check 2.6 (1/3/2017)    Most recent INR  2.1 (4/20/2018)    Weekly max warfarin dose     Target end date     INR check location     Preferred lab     Send INR reminders to         Comments           1 tablet ( 5 mg ) sat,sun,tues,thurs 1 1/2 on Saturday.

## 2018-04-20 NOTE — MR AVS SNAPSHOT
[address] [facility] [address] 
 
 
 [address] 
[phone] Patient: Lisbet Rinaldi MRN: [id number] CXO:1/0/4842 Visit Information Date & Time Provider Department Dept. Phone Encounter #  
 4/20/2018  7:30 AM Jerrye Goodpasture, NP [facility] [address] [facility] [phone] [id number] Your Appointments 5/25/2018  7:30 AM  
ESTABLISHED PATIENT with Jerrye Goodpasture, NP  
[address] ([facility] Appt Note: PT  
 [address] [address] 27[address] [facility] [address] [address] Upcoming Health Maintenance Date Due  
 GLAUCOMA SCREENING Q2Y 4/4/2012 FOBT Q 1 YEAR AGE 50-75 1/29/2017 MEDICARE YEARLY EXAM 2/10/2019 DTaP/Tdap/Td series (2 - Td) 6/26/2027 Allergies as of 4/20/2018  Review Complete On: 4/20/2018 By: Livan Cutler LPN Severity Noted Reaction Type Reactions Tape [Adhesive] High 11/07/2017    Rash, Angioedema Morphine  05/24/2013    Unknown (comments) Current Immunizations  Never Reviewed Name Date Td, Adsorbed PF 6/26/2017 Not reviewed this visit You Were Diagnosed With   
  
 Codes Comments Essential hypertension    -  Primary ICD-10-CM: I10 
ICD-9-CM: 401.9 Chronic atrial fibrillation (HCC)     ICD-10-CM: F46.3 ICD-9-CM: 427.31 Hyperlipidemia, unspecified hyperlipidemia type     ICD-10-CM: E78.5 ICD-9-CM: 272.4 Current use of long term anticoagulation     ICD-10-CM: Z79.01 
ICD-9-CM: V58.61 Nocturia     ICD-10-CM: R35.1 ICD-9-CM: 788.43 Vitals BP Pulse Temp Resp Height(growth percentile) Weight(growth percentile) 128/78 (BP 1 Location: Left arm, BP Patient Position: Sitting) 86 96 °F (35.6 °C) (Temporal) 20 6' (1.829 m) 243 lb 9.6 oz (110.5 kg) SpO2 BMI Smoking Status 99% 33.04 kg/m2 Former Smoker BMI and BSA Data Body Mass Index Body Surface Area 33.04 kg/m 2 2.37 m 2 Preferred Pharmacy Pharmacy Name Phone [facility] [address] [place] [address] [facility] [address] [phone] Your Updated Medication List  
  
   
This list is accurate as of 4/20/18  7:54 AM.  Always use your most recent med list.  
  
  
  
  
 aspirin 81 mg tablet Take 81 mg by mouth. atorvastatin 80 mg tablet Commonly known as:  LIPITOR Take 1 Tab by mouth nightly. fosinopril 20 mg tablet Commonly known as:  MONOPRIL Take 20 mg by mouth daily. ibuprofen 800 mg tablet Commonly known as:  MOTRIN  
TAKE 1 TABLET TWICE A DAY  Indications: OSTEOARTHRITIS, Pain LIDODERM 5 % Generic drug:  lidocaine  
by TransDERmal route every twenty-four (24) hours. Apply patch to the affected area for 12 hours a day and remove for 12 hours a day. meloxicam 7.5 mg tablet Commonly known as:  MOBIC  
TAKE 1 TABLET EVERY DAY AS NEEDED FOR SEVERE JOINT PAIN  
  
 MSM PO Take  by mouth.  
  
 multivitamin tablet Commonly known as:  ONE A DAY Take 1 tablet by mouth daily. warfarin 5 mg tablet Commonly known as:  COUMADIN Take 1 Tab by mouth daily. Pt takes 1 1/2  tab Sat., and 1 tab sun., tues. , and thurs. None on other days Prescriptions Sent to Pharmacy Refills  
 ibuprofen (MOTRIN) 800 mg tablet 1 Sig: TAKE 1 TABLET TWICE A DAY  Indications: OSTEOARTHRITIS, Pain Class: Normal  
 Pharmacy: [address] [place] [address] Ph #: [phone]  
 atorvastatin (LIPITOR) 80 mg tablet 4 Sig: Take 1 Tab by mouth nightly. Class: Normal  
 Pharmacy: [address] [place] [address] Ph #: [phone] Route: Oral  
 warfarin (COUMADIN) 5 mg tablet 3 Sig: Take 1 Tab by mouth daily. Pt takes 1 1/2  tab Sat., and 1 tab sun., tues. , and thurs. None on other days Class: Normal  
 Pharmacy: [facility], [address]  #: [phone] Route: Oral  
  
We Performed the Following AMB POC PT/INR [85078 CPT(R)] CBC WITH AUTOMATED DIFF [25026 CPT(R)] COLLECTION CAPILLARY BLOOD SPECIMEN [54532 CPT(R)] COLLECTION VENOUS BLOOD,VENIPUNCTURE [id number] CPT(R)] LIPID PANEL [96791 CPT(R)] METABOLIC PANEL, COMPREHENSIVE [32656 CPT(R)] PSA, DIAGNOSTIC (PROSTATE SPECIFIC AG) [id number] CPT(R)] Introducing [facility] SERVICES! Dear Eunice Reid: Thank you for requesting a [employer] account. Our records indicate that you already have an active [employer] account. You can access your account anytime at [email or web address] Did you know that you can access your hospital and ER discharge instructions at any time in [employer]? You can also review all of your test results from your hospital stay or ER visit. Additional Information If you have questions, please visit the Frequently Asked Questions section of the [employer] website at [email or web address] Remember, [employer] is NOT to be used for urgent needs. For medical emergencies, dial 911. Now available from your iPhone and Android! Please provide this summary of care documentation to your next provider. Your primary care clinician is listed as Rangel Lemus. If you have any questions after today's visit, please call [phone].

## 2018-04-21 LAB
ALBUMIN SERPL-MCNC: 4.1 G/DL (ref 3.5–4.8)
ALBUMIN/GLOB SERPL: 2.1 {RATIO} (ref 1.2–2.2)
ALP SERPL-CCNC: 136 IU/L (ref 39–117)
ALT SERPL-CCNC: 22 IU/L (ref 0–44)
AST SERPL-CCNC: 26 IU/L (ref 0–40)
BASOPHILS # BLD AUTO: 0 X10E3/UL (ref 0–0.2)
BASOPHILS NFR BLD AUTO: 0 %
BILIRUB SERPL-MCNC: 1.1 MG/DL (ref 0–1.2)
BUN SERPL-MCNC: 20 MG/DL (ref 8–27)
BUN/CREAT SERPL: 27 (ref 10–24)
CALCIUM SERPL-MCNC: 8.7 MG/DL (ref 8.6–10.2)
CHLORIDE SERPL-SCNC: 100 MMOL/L (ref 96–106)
CHOLEST SERPL-MCNC: 144 MG/DL (ref 100–199)
CO2 SERPL-SCNC: 25 MMOL/L (ref 18–29)
CREAT SERPL-MCNC: 0.73 MG/DL (ref 0.76–1.27)
EOSINOPHIL # BLD AUTO: 0.1 X10E3/UL (ref 0–0.4)
EOSINOPHIL NFR BLD AUTO: 2 %
ERYTHROCYTE [DISTWIDTH] IN BLOOD BY AUTOMATED COUNT: 13.8 % (ref 12.3–15.4)
GFR SERPLBLD CREATININE-BSD FMLA CKD-EPI: 108 ML/MIN/1.73
GFR SERPLBLD CREATININE-BSD FMLA CKD-EPI: 93 ML/MIN/1.73
GLOBULIN SER CALC-MCNC: 2 G/DL (ref 1.5–4.5)
GLUCOSE SERPL-MCNC: 75 MG/DL (ref 65–99)
HCT VFR BLD AUTO: 46.4 % (ref 37.5–51)
HDLC SERPL-MCNC: 47 MG/DL
HGB BLD-MCNC: 15.7 G/DL (ref 13–17.7)
IMM GRANULOCYTES # BLD: 0 X10E3/UL (ref 0–0.1)
IMM GRANULOCYTES NFR BLD: 0 %
LDLC SERPL CALC-MCNC: 86 MG/DL (ref 0–99)
LYMPHOCYTES # BLD AUTO: 0.9 X10E3/UL (ref 0.7–3.1)
LYMPHOCYTES NFR BLD AUTO: 16 %
MCH RBC QN AUTO: 32.3 PG (ref 26.6–33)
MCHC RBC AUTO-ENTMCNC: 33.8 G/DL (ref 31.5–35.7)
MCV RBC AUTO: 96 FL (ref 79–97)
MONOCYTES # BLD AUTO: 0.7 X10E3/UL (ref 0.1–0.9)
MONOCYTES NFR BLD AUTO: 13 %
NEUTROPHILS # BLD AUTO: 3.8 X10E3/UL (ref 1.4–7)
NEUTROPHILS NFR BLD AUTO: 69 %
PLATELET # BLD AUTO: 180 X10E3/UL (ref 150–379)
POTASSIUM SERPL-SCNC: 4.1 MMOL/L (ref 3.5–5.2)
PROT SERPL-MCNC: 6.1 G/DL (ref 6–8.5)
PSA SERPL-MCNC: 1 NG/ML (ref 0–4)
RBC # BLD AUTO: 4.86 X10E6/UL (ref 4.14–5.8)
SODIUM SERPL-SCNC: 139 MMOL/L (ref 134–144)
TRIGL SERPL-MCNC: 56 MG/DL (ref 0–149)
VLDLC SERPL CALC-MCNC: 11 MG/DL (ref 5–40)
WBC # BLD AUTO: 5.6 X10E3/UL (ref 3.4–10.8)

## 2018-04-22 NOTE — PROGRESS NOTES
Subjective:     Sendy Green is a 72 y.o. male who presents today with the following:  Chief Complaint   Patient presents with    Anticoagulation       Patient Active Problem List   Diagnosis Code    CAD (coronary artery disease) I25.10    Atrial fibrillation ([facility] I48.91    Hyperlipidemia E78.5    Hypertension I10    Congestive heart failure ([facility] I50.9    Current use of long term anticoagulation Z79.01     Arthur question the use of cannabis oil for pain control    COMPLIANT WITH MEDICATION:   HTN; Denies chest pain, dyspnea, palpitations, headache and blurred vision. Blood pressure normotensive. Hyperlipidemia: atorvastatin and diet control    Nocutria: 1 to 2 times per night. ROS:  Gen: denies fever, chills, fatigue, weight loss, weight gain  HEENT:denies blurry vision, nasal congestion, sore throat  Resp: denies dypsnea, cough, wheezing  CV: denies chest pain radiating to the jaws or arms, palpitations, lower extremity edema  Abd: denies nausea, vomiting, diarrhea, constipation  Neuro: denies numbness/tingling  Endo: denies polyuria, polydipsia, heat/cold intolerance  Heme: no lymphadenopathy    Allergies   Allergen Reactions    Tape [Adhesive] Rash and Angioedema    Morphine Unknown (comments)         Current Outpatient Prescriptions:     ibuprofen (MOTRIN) 800 mg tablet, TAKE 1 TABLET TWICE A DAY  Indications: OSTEOARTHRITIS, Pain, Disp: 180 Tab, Rfl: 1    atorvastatin (LIPITOR) 80 mg tablet, Take 1 Tab by mouth nightly., Disp: 90 Tab, Rfl: 4    warfarin (COUMADIN) 5 mg tablet, Take 1 Tab by mouth daily. Pt takes 1 1/2  tab Sat., and 1 tab sun., tues. , and thurs. None on other days, Disp: 90 Tab, Rfl: 3    meloxicam (MOBIC) 7.5 mg tablet, TAKE 1 TABLET EVERY DAY AS NEEDED FOR SEVERE JOINT PAIN, Disp: 90 Tab, Rfl: 0    METHYLSULFONYLMETHANE (MSM PO), Take  by mouth., Disp: , Rfl:     multivitamin (ONE A DAY) tablet, Take 1 tablet by mouth daily. , Disp: , Rfl:     lidocaine (LIDODERM) 5 %(700 mg/patch), by TransDERmal route every twenty-four (24) hours. Apply patch to the affected area for 12 hours a day and remove for 12 hours a day., Disp: , Rfl:     aspirin 81 mg tablet, Take 81 mg by mouth., Disp: , Rfl:     fosinopril (MONOPRIL) 20 mg tablet, Take 20 mg by mouth daily. , Disp: , Rfl:     Past Medical History:   Diagnosis Date    Acute [facility] ([facility]     Arthritis     Atrial fibrillation ([facility]     Congestive heart failure, unspecified     mild    Edema     Hypercholesterolemia     Hypertension     Unspecified vitamin D deficiency        Past Surgical History:   Procedure Laterality Date    HX ANGIOPLASTY      HX KNEE ARTHROSCOPY Right     HX PACEMAKER  10/2017    HX TONSILLECTOMY         History   Smoking Status    Former Smoker   Smokeless Tobacco    Never Used       Social History     Social History    Marital status:      Spouse name: N/A    Number of children: N/A    Years of education: N/A     Social History Main Topics    Smoking status: Former Smoker    Smokeless tobacco: Never Used    Alcohol use No    Drug use: None    Sexual activity: Not Asked     Other Topics Concern     Service Yes    Blood Transfusions No    Caffeine Concern No    Occupational Exposure No    Hobby Hazards Yes     fishing    Sleep Concern No    Stress Concern No    Weight Concern Yes     over weight    Special Diet No    Back Care Yes     pain    Exercise Yes    Bike Helmet Yes    Seat Belt Yes    Self-Exams Yes     Social History Narrative       Family History   Problem Relation Age of Onset    Lung Disease Mother     Cancer Mother      Lung--smoker    Heart Disease Father     Cancer Father      Prostate    No Known Problems Sister          Objective:     Visit Vitals    /78 (BP 1 Location: Left arm, BP Patient Position: Sitting)    Pulse 86    Temp 96 °F (35.6 °C) (Temporal)    Resp 20    Ht 6' (1.829 m)    Wt 243 lb 9.6 oz (110.5 kg)    SpO2 99%    BMI 33.04 kg/m2     Body mass index is 33.04 kg/(m^2). General: Alert and oriented. No acute distress. Well nourished  HEENT :  Eyes: pupils equal, round, react to light and accommodation. Nose: patent. Mouth and throat is clear. Neck:supple full range of motion no thyromegaly. Trachea midline, No carotid bruits. No significant lymphadenopathy  Lungs[de-identified] clear to auscultation without wheezes, rales, or rhonchi. Heart :RRR, S1 & S2 are normal intensity. No murmur; no gallop  Extremities: without clubbing, cyanosis, or edema  Pulses: radial and femoral pulses are normal  Neuro: HMF intact. Cranial nerves II through XII grossly normal.      Results for orders placed or performed in visit on 04/20/18   LIPID PANEL   Result Value Ref Range    Cholesterol, total 144 100 - 199 mg/dL    Triglyceride 56 0 - 149 mg/dL    HDL Cholesterol 47 >39 mg/dL    VLDL, calculated 11 5 - 40 mg/dL    LDL, calculated 86 0 - 99 mg/dL   CBC WITH AUTOMATED DIFF   Result Value Ref Range    WBC 5.6 3.4 - 10.8 x10E3/uL    RBC 4.86 4.14 - 5.80 x10E6/uL    HGB 15.7 13.0 - 17.7 g/dL    HCT 46.4 37.5 - 51.0 %    MCV 96 79 - 97 fL    MCH 32.3 26.6 - 33.0 pg    MCHC 33.8 31.5 - 35.7 g/dL    RDW 13.8 12.3 - 15.4 %    PLATELET 287 279 - 182 x10E3/uL    NEUTROPHILS 69 Not Estab. %    Lymphocytes 16 Not Estab. %    MONOCYTES 13 Not Estab. %    EOSINOPHILS 2 Not Estab. %    BASOPHILS 0 Not Estab. %    ABS. NEUTROPHILS 3.8 1.4 - 7.0 x10E3/uL    Abs Lymphocytes 0.9 0.7 - 3.1 x10E3/uL    ABS. MONOCYTES 0.7 0.1 - 0.9 x10E3/uL    ABS. EOSINOPHILS 0.1 0.0 - 0.4 x10E3/uL    ABS. BASOPHILS 0.0 0.0 - 0.2 x10E3/uL    IMMATURE GRANULOCYTES 0 Not Estab. %    ABS. IMM.  GRANS. 0.0 0.0 - 0.1 Z25X8/HG   METABOLIC PANEL, COMPREHENSIVE   Result Value Ref Range    Glucose 75 65 - 99 mg/dL    BUN 20 8 - 27 mg/dL    Creatinine 0.73 (L) 0.76 - 1.27 mg/dL    GFR est non-AA 93 >59 mL/min/1.73    GFR est  >59 mL/min/1.73    BUN/Creatinine ratio 27 (H) 10 - 24 Sodium 139 134 - 144 mmol/L    Potassium 4.1 3.5 - 5.2 mmol/L    Chloride 100 96 - 106 mmol/L    CO2 25 18 - 29 mmol/L    Calcium 8.7 8.6 - 10.2 mg/dL    Protein, total 6.1 6.0 - 8.5 g/dL    Albumin 4.1 3.5 - 4.8 g/dL    GLOBULIN, TOTAL 2.0 1.5 - 4.5 g/dL    A-G Ratio 2.1 1.2 - 2.2    Bilirubin, total 1.1 0.0 - 1.2 mg/dL    Alk. phosphatase 136 (H) 39 - 117 IU/L    AST (SGOT) 26 0 - 40 IU/L    ALT (SGPT) 22 0 - 44 IU/L   PSA, DIAGNOSTIC (PROSTATE SPECIFIC AG)   Result Value Ref Range    Prostate Specific Ag 1.0 0.0 - 4.0 ng/mL   AMB POC PT/INR   Result Value Ref Range    VALID INTERNAL CONTROL POC Yes     Prothrombin time (POC) 24.9 (A) 10.4 - 14 seconds    INR POC 2.1 2 - 3       Results for orders placed or performed in visit on 04/20/18   LIPID PANEL   Result Value Ref Range    Cholesterol, total 144 100 - 199 mg/dL    Triglyceride 56 0 - 149 mg/dL    HDL Cholesterol 47 >39 mg/dL    VLDL, calculated 11 5 - 40 mg/dL    LDL, calculated 86 0 - 99 mg/dL    Narrative    Performed at:  [address]  [id number]  : Rosy Sparrow MD, Phone:  [phone]   CBC WITH AUTOMATED DIFF   Result Value Ref Range    WBC 5.6 3.4 - 10.8 x10E3/uL    RBC 4.86 4.14 - 5.80 x10E6/uL    HGB 15.7 13.0 - 17.7 g/dL    HCT 46.4 37.5 - 51.0 %    MCV 96 79 - 97 fL    MCH 32.3 26.6 - 33.0 pg    MCHC 33.8 31.5 - 35.7 g/dL    RDW 13.8 12.3 - 15.4 %    PLATELET 364 591 - 439 x10E3/uL    NEUTROPHILS 69 Not Estab. %    Lymphocytes 16 Not Estab. %    MONOCYTES 13 Not Estab. %    EOSINOPHILS 2 Not Estab. %    BASOPHILS 0 Not Estab. %    ABS. NEUTROPHILS 3.8 1.4 - 7.0 x10E3/uL    Abs Lymphocytes 0.9 0.7 - 3.1 x10E3/uL    ABS. MONOCYTES 0.7 0.1 - 0.9 x10E3/uL    ABS. EOSINOPHILS 0.1 0.0 - 0.4 x10E3/uL    ABS. BASOPHILS 0.0 0.0 - 0.2 x10E3/uL    IMMATURE GRANULOCYTES 0 Not Estab. %    ABS. IMM.  GRANS. 0.0 0.0 - 0.1 x10E3/uL    Narrative    Performed at:  [place] [address]  [facility] [place], [place]  [id number]  : Bimal Adams MD, Phone:  [phone]   METABOLIC PANEL, COMPREHENSIVE   Result Value Ref Range    Glucose 75 65 - 99 mg/dL    BUN 20 8 - 27 mg/dL    Creatinine 0.73 (L) 0.76 - 1.27 mg/dL    GFR est non-AA 93 >59 mL/min/1.73    GFR est  >59 mL/min/1.73    BUN/Creatinine ratio 27 (H) 10 - 24    Sodium 139 134 - 144 mmol/L    Potassium 4.1 3.5 - 5.2 mmol/L    Chloride 100 96 - 106 mmol/L    CO2 25 18 - 29 mmol/L    Calcium 8.7 8.6 - 10.2 mg/dL    Protein, total 6.1 6.0 - 8.5 g/dL    Albumin 4.1 3.5 - 4.8 g/dL    GLOBULIN, TOTAL 2.0 1.5 - 4.5 g/dL    A-G Ratio 2.1 1.2 - 2.2    Bilirubin, total 1.1 0.0 - 1.2 mg/dL    Alk. phosphatase 136 (H) 39 - 117 IU/L    AST (SGOT) 26 0 - 40 IU/L    ALT (SGPT) 22 0 - 44 IU/L    Narrative    Performed at:  [address]  [id number]  : Bimal Adams MD, Phone:  [phone]   PSA, DIAGNOSTIC (PROSTATE SPECIFIC AG)   Result Value Ref Range    Prostate Specific Ag 1.0 0.0 - 4.0 ng/mL    Narrative    Performed at:  [address]  [id number]  : Bimal Adams MD, Phone:  [phone]   AMB POC PT/INR   Result Value Ref Range    VALID INTERNAL CONTROL POC Yes     Prothrombin time (POC) 24.9 (A) 10.4 - 14 seconds    INR POC 2.1 2 - 3       Assessment/ Plan:     1. Chronic atrial fibrillation (HCC)    - COLLECTION CAPILLARY BLOOD SPECIMEN  - AMB POC PT/INR  - LIPID PANEL  - CBC WITH AUTOMATED DIFF  - METABOLIC PANEL, COMPREHENSIVE  - COLLECTION VENOUS BLOOD,VENIPUNCTURE  - PROSTATE SPECIFIC AG    2. Hyperlipidemia, unspecified hyperlipidemia type    - LIPID PANEL  - CBC WITH AUTOMATED DIFF  - METABOLIC PANEL, COMPREHENSIVE  - COLLECTION VENOUS BLOOD,VENIPUNCTURE  - PROSTATE SPECIFIC AG    3.  Essential hypertension    - LIPID PANEL  - CBC WITH AUTOMATED DIFF  - METABOLIC PANEL, COMPREHENSIVE  - COLLECTION VENOUS BLOOD,VENIPUNCTURE  - PROSTATE SPECIFIC AG    4. Current use of long term anticoagulation    -pt AND inr     no change coumadin dosage   Recheck in 1 month    5 Nocturia      - COLLECTION VENOUS BLOOD,VENIPUNCTURE  - PROSTATE SPECIFIC AG      Orders Placed This Encounter    COLLECTION CAPILLARY BLOOD SPECIMEN    COLLECTION VENOUS BLOOD,VENIPUNCTURE    LIPID PANEL    CBC WITH AUTOMATED DIFF    METABOLIC PANEL, COMPREHENSIVE    PROSTATE SPECIFIC AG    AMB POC PT/INR    ibuprofen (MOTRIN) 800 mg tablet     Sig: TAKE 1 TABLET TWICE A DAY  Indications: OSTEOARTHRITIS, Pain     Dispense:  180 Tab     Refill:  1    atorvastatin (LIPITOR) 80 mg tablet     Sig: Take 1 Tab by mouth nightly. Dispense:  90 Tab     Refill:  4    warfarin (COUMADIN) 5 mg tablet     Sig: Take 1 Tab by mouth daily. Pt takes 1 1/2  tab Sat., and 1 tab sun., tues. , and thurs. None on other days     Dispense:  90 Tab     Refill:  3         Verbal and written instructions (see AVS) provided.  Patient expresses understanding of diagnosis and treatment plan. Health Maintenance Due   Topic Date Due    GLAUCOMA SCREENING Q2Y  04/04/2012    FOBT Q 1 YEAR AGE 50-75  01/29/2017         Follow-up Disposition:  Return in about 1 month (around 5/20/2018).       IVAN Blakely

## 2018-04-22 NOTE — PROGRESS NOTES
Good news  Cholesterol levels are excellent    CBC no anemia    Metabolic panel kidneys and liver functions are stable    PSA within normal limits

## 2018-04-22 NOTE — ACP (ADVANCE CARE PLANNING)
Discussed importance of advanced medical directives with patient. Patient is capable of making decisions.   Cris GUADARRAMAC

## 2018-05-25 ENCOUNTER — OFFICE VISIT (OUTPATIENT)
Dept: FAMILY MEDICINE CLINIC | Age: 73
End: 2018-05-25

## 2018-05-25 VITALS
BODY MASS INDEX: 33.24 KG/M2 | WEIGHT: 245.4 LBS | SYSTOLIC BLOOD PRESSURE: 140 MMHG | HEART RATE: 75 BPM | DIASTOLIC BLOOD PRESSURE: 80 MMHG | TEMPERATURE: 97.3 F | OXYGEN SATURATION: 95 % | HEIGHT: 72 IN | RESPIRATION RATE: 20 BRPM

## 2018-05-25 DIAGNOSIS — I48.20 CHRONIC ATRIAL FIBRILLATION (HCC): Primary | ICD-10-CM

## 2018-05-25 LAB
INR BLD: 2.9 (ref 2–3)
PT POC: 34.2 SECONDS (ref 10.4–14)
VALID INTERNAL CONTROL?: YES

## 2018-05-25 RX ORDER — WARFARIN SODIUM 5 MG/1
5 TABLET ORAL DAILY
Qty: 90 TAB | Refills: 3
Start: 2018-05-25 | End: 2018-07-06 | Stop reason: SDUPTHER

## 2018-05-25 NOTE — PROGRESS NOTES
Follow up for anticoagulation.   Indication: Atrial fibrillation  Current medication:  warfarin5 mg Saturday 7.5 mg 5 mg sunday tuesday thursday none other days  Current symptoms: none  Current control agent: none  Current anticoagulant: warfarinas directed  History of bleeding problems: NO  Results for orders placed or performed in visit on 05/25/18   AMB POC PT/INR   Result Value Ref Range    VALID INTERNAL CONTROL POC Yes     Prothrombin time (POC) 34.2 (A) 10.4 - 14 seconds    INR POC 2.9 2 - 3

## 2018-05-25 NOTE — MR AVS SNAPSHOT
[address] [facility] 
 
 
 [address] [employer] [address] 
[phone] Patient: Nafisa Barone MRN: [id number] Visit Information Date & Time Provider Department Dept. Phone Encounter #  
 5/25/2018  7:30 AM Pk Miller NP [facility] Ean Montano [address] [facility] [phone] [id number] Your Appointments 7/6/2018  7:30 AM  
ESTABLISHED PATIENT with HARRI[facility] Olive[facility] Appt Note: PT  
 [address] [address]  
[address] [facility] [address] [facility] [address] Upcoming Health Maintenance Date Due  
 GLAUCOMA SCREENING Q2Y 4/4/2012 FOBT Q 1 YEAR AGE 50-75 1/29/2017 Influenza Age 5 to Adult 8/1/2018 MEDICARE YEARLY EXAM 2/10/2019 DTaP/Tdap/Td series (2 - Td) 6/26/2027 Allergies as of 5/25/2018  Review Complete On: 5/25/2018 By: Chiki Sweeney LPN Severity Noted Reaction Type Reactions Tape [Adhesive] High 11/07/2017    Rash, Angioedema Morphine  05/24/2013    Unknown (comments) Current Immunizations  Never Reviewed Name Date Td, Adsorbed PF 6/26/2017 Not reviewed this visit You Were Diagnosed With   
  
 Codes Comments Chronic atrial fibrillation (HCC)    -  Primary ICD-10-CM: H03.5 ICD-9-CM: 427.31 Vitals BP Pulse Temp Resp Height(growth percentile) 140/80 (BP 1 Location: Left arm, BP Patient Position: Sitting) 75 97.3 °F (36.3 °C) (Temporal) 20 6' (1.829 m) Weight(growth percentile) SpO2 BMI Smoking Status 245 lb 6.4 oz (111.3 kg) 95% 33.28 kg/m2 Former Smoker BMI and BSA Data Body Mass Index Body Surface Area  
 33.28 kg/m 2 2.38 m 2 Preferred Pharmacy Pharmacy Name Phone [address] [place] [address] [address] [facility] [address] [phone] Your Updated Medication List  
  
   
 This list is accurate as of 5/25/18  8:00 AM.  Always use your most recent med list.  
  
  
  
  
 aspirin 81 mg tablet Take 81 mg by mouth. atorvastatin 80 mg tablet Commonly known as:  LIPITOR Take 1 Tab by mouth nightly. fosinopril 20 mg tablet Commonly known as:  MONOPRIL Take 20 mg by mouth daily. ibuprofen 800 mg tablet Commonly known as:  MOTRIN  
TAKE 1 TABLET TWICE A DAY  Indications: OSTEOARTHRITIS, Pain LIDODERM 5 % Generic drug:  lidocaine  
by TransDERmal route every twenty-four (24) hours. Apply patch to the affected area for 12 hours a day and remove for 12 hours a day. MSM PO Take  by mouth.  
  
 multivitamin tablet Commonly known as:  ONE A DAY Take 1 tablet by mouth daily. warfarin 5 mg tablet Commonly known as:  COUMADIN Take 1 Tab by mouth daily. Pt takes 1 1/2  tab Sat., and 1 tab sun., tues. , and thursday We Performed the Following AMB POC PT/INR [47501 CPT(R)] COLLECTION CAPILLARY BLOOD SPECIMEN [52931 CPT(R)] Introducing [facility] SERVICES! Dear Annie Bottom: Thank you for requesting a [employer] account. Our records indicate that you already have an active [email or web address] account. You can access your account anytime at [email or web address] Did you know that you can access your hospital and ER discharge instructions at any time in [email or web address]? You can also review all of your test results from your hospital stay or ER visit. Additional Information If you have questions, please visit the Frequently Asked Questions section of the [email or web address] website at [email or web address] Remember, [email or web address] is NOT to be used for urgent needs. For medical emergencies, dial 911. Now available from your iPhone and Android! Please provide this summary of care documentation to your next provider. Your primary care clinician is listed as Enriqueta Schneider.  If you have any questions after today's visit, please call [phone].

## 2018-05-27 NOTE — ACP (ADVANCE CARE PLANNING)
Discussed importance of advanced medical directives with patient. Patient is capable of making decisions.   Екатерина ALMAZAN

## 2018-05-27 NOTE — PROGRESS NOTES
Subjective:     Amanda Lujan is a 72 y.o. male who presents today with the following:  Chief Complaint   Patient presents with    Anticoagulation       Patient Active Problem List   Diagnosis Code    CAD (coronary artery disease) I25.10    Atrial fibrillation ([facility] I48.91    Hyperlipidemia E78.5    Hypertension I10    Congestive heart failure ([facility] I50.9    Current use of long term anticoagulation Z79.01   Tried cannabis oil for arthritis not effective. Meloxicam not effect. Stopped taking both. Good relief from ibuprofen monitoring PT and INR therapeutic       COMPLIANT WITH MEDICATION:   Anticoagulation. Dx:  Atrial fibrillation. Current symptoms: none  Current control agent: none  Current anticoagulant: warfarintake 5 mg daily 1.5 on Sat Sunday and Thursday  History of bleeding problems: none  Lab Results   Component Value Date/Time    INR 1.5 (H) 10/05/2017 08:26 AM    INR POC 2.9 05/25/2018 07:29 AM    INR POC 2.1 04/20/2018 07:30 AM    INR POC 2.0 03/09/2018 07:54 AM    Prothrombin time 15.4 (H) 10/05/2017 08:26 AM   Denies chest pain, dyspnea, palpitations, headache and blurred vision. Blood pressure normotensive. ROS:  Gen: denies fever, chills, fatigue, weight loss, weight gain  HEENT:denies blurry vision, nasal congestion, sore throat  Resp: denies dypsnea, cough, wheezing  CV: denies chest pain radiating to the jaws or arms, palpitations, lower extremity edema  Abd: denies nausea, vomiting, diarrhea, constipation  Neuro: denies numbness/tingling  Endo: denies polyuria, polydipsia, heat/cold intolerance  Heme: no lymphadenopathy    Allergies   Allergen Reactions    Tape [Adhesive] Rash and Angioedema    Morphine Unknown (comments)         Current Outpatient Prescriptions:     warfarin (COUMADIN) 5 mg tablet, Take 1 Tab by mouth daily. Pt takes 1 1/2  tab Sat., and 1 tab sun., tues. , and thursday, Disp: 90 Tab, Rfl: 3    ibuprofen (MOTRIN) 800 mg tablet, TAKE 1 TABLET TWICE A DAY Indications: OSTEOARTHRITIS, Pain, Disp: 180 Tab, Rfl: 1    atorvastatin (LIPITOR) 80 mg tablet, Take 1 Tab by mouth nightly., Disp: 90 Tab, Rfl: 4    METHYLSULFONYLMETHANE (MSM PO), Take  by mouth., Disp: , Rfl:     multivitamin (ONE A DAY) tablet, Take 1 tablet by mouth daily. , Disp: , Rfl:     lidocaine (LIDODERM) 5 %(700 mg/patch), by TransDERmal route every twenty-four (24) hours. Apply patch to the affected area for 12 hours a day and remove for 12 hours a day., Disp: , Rfl:     aspirin 81 mg tablet, Take 81 mg by mouth., Disp: , Rfl:     fosinopril (MONOPRIL) 20 mg tablet, Take 20 mg by mouth daily. , Disp: , Rfl:     Past Medical History:   Diagnosis Date    Acute [facility] ([facility]     Arthritis     Atrial fibrillation (HCC)     Congestive heart failure, unspecified     mild    Edema     Hypercholesterolemia     Hypertension     Unspecified vitamin D deficiency        Past Surgical History:   Procedure Laterality Date    HX ANGIOPLASTY      HX KNEE ARTHROSCOPY Right     HX PACEMAKER  10/2017    HX TONSILLECTOMY         History   Smoking Status    Former Smoker   Smokeless Tobacco    Never Used       Social History     Social History    Marital status:      Spouse name: N/A    Number of children: N/A    Years of education: N/A     Social History Main Topics    Smoking status: Former Smoker    Smokeless tobacco: Never Used    Alcohol use No    Drug use: None    Sexual activity: Not Asked     Other Topics Concern     Service Yes    Blood Transfusions No    Caffeine Concern No    Occupational Exposure No    Hobby Hazards Yes     fishing    Sleep Concern No    Stress Concern No    Weight Concern Yes     over weight    Special Diet No    Back Care Yes     pain    Exercise Yes    Bike Helmet Yes    Seat Belt Yes    Self-Exams Yes     Social History Narrative       Family History   Problem Relation Age of Onset    Lung Disease Mother     Cancer Mother Lung--smoker    Heart Disease Father     Cancer Father      Prostate    No Known Problems Sister          Objective:     Visit Vitals    /80 (BP 1 Location: Left arm, BP Patient Position: Sitting)    Pulse 75    Temp 97.3 °F (36.3 °C) (Temporal)    Resp 20    Ht 6' (1.829 m)    Wt 245 lb 6.4 oz (111.3 kg)    SpO2 95%    BMI 33.28 kg/m2     Body mass index is 33.28 kg/(m^2). General: Alert and oriented. No acute distress. Well nourished  HEENT :  Eyes: pupils equal, round, react to light and accommodation. Nose: patent. Mouth and throat is clear. Neck:supple full range of motion no thyromegaly. Trachea midline, No carotid bruits. No significant lymphadenopathy  Lungs[de-identified] clear to auscultation without wheezes, rales, or rhonchi. Heart :RRR, S1 & S2 are normal intensity. No murmur; no gallop  Extremities: without clubbing, cyanosis, or edema  Pulses: radial and femoral pulses are normal  Neuro: HMF intact. Cranial nerves II through XII grossly normal      Results for orders placed or performed in visit on 05/25/18   AMB POC PT/INR   Result Value Ref Range    VALID INTERNAL CONTROL POC Yes     Prothrombin time (POC) 34.2 (A) 10.4 - 14 seconds    INR POC 2.9 2 - 3       Results for orders placed or performed in visit on 05/25/18   AMB POC PT/INR   Result Value Ref Range    VALID INTERNAL CONTROL POC Yes     Prothrombin time (POC) 34.2 (A) 10.4 - 14 seconds    INR POC 2.9 2 - 3       Assessment/ Plan:     1. Chronic atrial fibrillation (HCC)    - COLLECTION CAPILLARY BLOOD SPECIMEN  - AMB POC PT/INR      Orders Placed This Encounter    COLLECTION CAPILLARY BLOOD SPECIMEN    AMB POC PT/INR    warfarin (COUMADIN) 5 mg tablet     Sig: Take 1 Tab by mouth daily. Pt takes 1 1/2  tab Sat., and 1 tab sun., tues. , and thursday     Dispense:  90 Tab     Refill:  3         Verbal and written instructions (see AVS) provided.  Patient expresses understanding of diagnosis and treatment plan.     Health Maintenance Due   Topic Date Due    GLAUCOMA SCREENING Q2Y  04/04/2012    FOBT Q 1 YEAR AGE 50-75  01/29/2017         Follow-up Disposition:  Return in about 1 month (around 6/25/2018).       IVAN Jaramillo

## 2018-07-06 ENCOUNTER — OFFICE VISIT (OUTPATIENT)
Dept: FAMILY MEDICINE CLINIC | Age: 73
End: 2018-07-06

## 2018-07-06 VITALS
RESPIRATION RATE: 16 BRPM | WEIGHT: 241.2 LBS | DIASTOLIC BLOOD PRESSURE: 68 MMHG | BODY MASS INDEX: 32.67 KG/M2 | OXYGEN SATURATION: 97 % | SYSTOLIC BLOOD PRESSURE: 120 MMHG | HEIGHT: 72 IN | HEART RATE: 91 BPM | TEMPERATURE: 97.5 F

## 2018-07-06 DIAGNOSIS — I48.20 CHRONIC ATRIAL FIBRILLATION (HCC): Primary | ICD-10-CM

## 2018-07-06 LAB
INR BLD: 1.7 (ref 2–3)
PT POC: 20.4 SECONDS (ref 10.4–14)
VALID INTERNAL CONTROL?: YES

## 2018-07-06 RX ORDER — WARFARIN SODIUM 5 MG/1
5 TABLET ORAL DAILY
Qty: 90 TAB | Refills: 3 | Status: SHIPPED | OUTPATIENT
Start: 2018-07-06 | End: 2019-04-05 | Stop reason: SDUPTHER

## 2018-07-06 NOTE — PATIENT INSTRUCTIONS
Body Mass Index: Care Instructions  Your Care Instructions    Body mass index (BMI) can help you see if your weight is raising your risk for health problems. It uses a formula to compare how much you weigh with how tall you are. · A BMI lower than 18.5 is considered underweight. · A BMI between 18.5 and 24.9 is considered healthy. · A BMI between 25 and 29.9 is considered overweight. A BMI of 30 or higher is considered obese. If your BMI is in the normal range, it means that you have a lower risk for weight-related health problems. If your BMI is in the overweight or obese range, you may be at increased risk for weight-related health problems, such as high blood pressure, heart disease, stroke, arthritis or joint pain, and diabetes. If your BMI is in the underweight range, you may be at increased risk for health problems such as fatigue, lower protection (immunity) against illness, muscle loss, bone loss, hair loss, and hormone problems. BMI is just one measure of your risk for weight-related health problems. You may be at higher risk for health problems if you are not active, you eat an unhealthy diet, or you drink too much alcohol or use tobacco products. Follow-up care is a key part of your treatment and safety. Be sure to make and go to all appointments, and call your doctor if you are having problems. It's also a good idea to know your test results and keep a list of the medicines you take. How can you care for yourself at home? · Practice healthy eating habits. This includes eating plenty of fruits, vegetables, whole grains, lean protein, and low-fat dairy. · If your doctor recommends it, get more exercise. Walking is a good choice. Bit by bit, increase the amount you walk every day. Try for at least 30 minutes on most days of the week. · Do not smoke. Smoking can increase your risk for health problems. If you need help quitting, talk to your doctor about stop-smoking programs and medicines. These can increase your chances of quitting for good. · Limit alcohol to 2 drinks a day for men and 1 drink a day for women. Too much alcohol can cause health problems. If you have a BMI higher than 25  · Your doctor may do other tests to check your risk for weight-related health problems. This may include measuring the distance around your waist. A waist measurement of more than 40 inches in men or 35 inches in women can increase the risk of weight-related health problems. · Talk with your doctor about steps you can take to stay healthy or improve your health. You may need to make lifestyle changes to lose weight and stay healthy, such as changing your diet and getting regular exercise. If you have a BMI lower than 18.5  · Your doctor may do other tests to check your risk for health problems. · Talk with your doctor about steps you can take to stay healthy or improve your health. You may need to make lifestyle changes to gain or maintain weight and stay healthy, such as getting more healthy foods in your diet and doing exercises to build muscle. Where can you learn more? Go to http://tyler-thomas.info/. Enter S176 in the search box to learn more about \"Body Mass Index: Care Instructions. \"  Current as of: October 13, 2016  Content Version: 11.4  © 1732-3297 Healthwise, [employer]. Care instructions adapted under license by [employer] (which disclaims liability or warranty for this information). If you have questions about a medical condition or this instruction, always ask your healthcare professional. [place] [address] any warranty or liability for your use of this information. Heart-Healthy Diet: Care Instructions  Your Care Instructions    A heart-healthy diet has lots of vegetables, fruits, nuts, beans, and whole grains, and is low in salt. It limits foods that are high in saturated fat, such as meats, cheeses, and fried foods.  It may be hard to change your diet, but even small changes can lower your risk of heart attack and heart disease. Follow-up care is a key part of your treatment and safety. Be sure to make and go to all appointments, and call your doctor if you are having problems. It's also a good idea to know your test results and keep a list of the medicines you take. How can you care for yourself at home? Watch your portions  · Learn what a serving is. A \"serving\" and a \"portion\" are not always the same thing. Make sure that you are not eating larger portions than are recommended. For example, a serving of pasta is ½ cup. A serving size of meat is 2 to 3 ounces. A 3-ounce serving is about the size of a deck of cards. Measure serving sizes until you are good at Anderson" them. Keep in mind that restaurants often serve portions that are 2 or 3 times the size of one serving. · To keep your energy level up and keep you from feeling hungry, eat often but in smaller portions. · Eat only the number of calories you need to stay at a healthy weight. If you need to lose weight, eat fewer calories than your body burns (through exercise and other physical activity). Eat more fruits and vegetables  · Eat a variety of fruit and vegetables every day. Dark green, deep orange, red, or yellow fruits and vegetables are especially good for you. Examples include spinach, carrots, peaches, and berries. · Keep carrots, celery, and other veggies handy for snacks. Buy fruit that is in season and store it where you can see it so that you will be tempted to eat it. · Cook dishes that have a lot of veggies in them, such as stir-fries and soups. Limit saturated and trans fat  · Read food labels, and try to avoid saturated and trans fats. They increase your risk of heart disease. Trans fat is found in many processed foods such as cookies and crackers. · Use olive or canola oil when you cook.  Try cholesterol-lowering spreads, such as Benecol or Take Control. · Bake, broil, grill, or steam foods instead of frying them. · Choose lean meats instead of high-fat meats such as hot dogs and sausages. Cut off all visible fat when you prepare meat. · Eat fish, skinless poultry, and meat alternatives such as soy products instead of high-fat meats. Soy products, such as tofu, may be especially good for your heart. · Choose low-fat or fat-free milk and dairy products. Eat fish  · Eat at least two servings of fish a week. Certain fish, such as salmon and tuna, contain omega-3 fatty acids, which may help reduce your risk of heart attack. Eat foods high in fiber  · Eat a variety of grain products every day. Include whole-grain foods that have lots of fiber and nutrients. Examples of whole-grain foods include oats, whole wheat bread, and brown rice. · Buy whole-grain breads and cereals, instead of white bread or pastries. Limit salt and sodium  · Limit how much salt and sodium you eat to help lower your blood pressure. · Taste food before you salt it. Add only a little salt when you think you need it. With time, your taste buds will adjust to less salt. · Eat fewer snack items, fast foods, and other high-salt, processed foods. Check food labels for the amount of sodium in packaged foods. · Choose low-sodium versions of canned goods (such as soups, vegetables, and beans). Limit sugar  · Limit drinks and foods with added sugar. These include candy, desserts, and soda pop. Limit alcohol  · Limit alcohol to no more than 2 drinks a day for men and 1 drink a day for women. Too much alcohol can cause health problems. When should you call for help? Watch closely for changes in your health, and be sure to contact your doctor if:  ? · You would like help planning heart-healthy meals. Where can you learn more? Go to http://tyler-thomas.info/. Enter V137 in the search box to learn more about \"Heart-Healthy Diet: Care Instructions. \"  Current as of: September 21, 2016  Content Version: 11.4  © 4454-0977 Healthwise, Incorporated. Care instructions adapted under license by [employer] (which disclaims liability or warranty for this information). If you have questions about a medical condition or this instruction, always ask your healthcare professional. [place] [address] any warranty or liability for your use of this information.

## 2018-07-06 NOTE — ACP (ADVANCE CARE PLANNING)
Discussed importance of advanced medical directives with patient. Patient is capable of making decisions.   Souleymane Zuleta NP-C

## 2018-07-06 NOTE — PROGRESS NOTES
Subjective:     Devi Ash is a 72 y.o. male who presents today with the following:  Chief Complaint   Patient presents with    Anticoagulation       Patient Active Problem List   Diagnosis Code    CAD (coronary artery disease) I25.10    Atrial fibrillation ([facility] I48.91    Hyperlipidemia E78.5    Hypertension I10    Congestive heart failure ([facility] I50.9    Current use of long term anticoagulation Z79.01         COMPLIANT WITH MEDICATION:    HTN:Denies chest pain, dyspnea, palpitations, headache and blurred vision. Blood pressure normotensive. ROS:  Gen: denies fever, chills, fatigue, weight loss, weight gain  HEENT:denies blurry vision, nasal congestion, sore throat  Resp: denies dypsnea, cough, wheezing  CV: denies chest pain radiating to the jaws or arms, palpitations, lower extremity edema  Abd: denies nausea, vomiting, diarrhea, constipation  Neuro: denies numbness/tingling  Endo: denies polyuria, polydipsia, heat/cold intolerance  Heme: no lymphadenopathy    Allergies   Allergen Reactions    Tape [Adhesive] Rash and Angioedema    Morphine Unknown (comments)         Current Outpatient Prescriptions:     warfarin (COUMADIN) 5 mg tablet, Take 1 Tab by mouth daily. Take 1 daily, Disp: 90 Tab, Rfl: 3    ibuprofen (MOTRIN) 800 mg tablet, TAKE 1 TABLET TWICE A DAY  Indications: OSTEOARTHRITIS, Pain, Disp: 180 Tab, Rfl: 1    atorvastatin (LIPITOR) 80 mg tablet, Take 1 Tab by mouth nightly., Disp: 90 Tab, Rfl: 4    METHYLSULFONYLMETHANE (MSM PO), Take  by mouth., Disp: , Rfl:     multivitamin (ONE A DAY) tablet, Take 1 tablet by mouth daily. , Disp: , Rfl:     lidocaine (LIDODERM) 5 %(700 mg/patch), by TransDERmal route every twenty-four (24) hours. Apply patch to the affected area for 12 hours a day and remove for 12 hours a day., Disp: , Rfl:     aspirin 81 mg tablet, Take 81 mg by mouth., Disp: , Rfl:     fosinopril (MONOPRIL) 20 mg tablet, Take 20 mg by mouth daily. , Disp: , Rfl:     Past Medical History:   Diagnosis Date    Acute MI ([facility]     Arthritis     Atrial fibrillation ([facility]     Congestive heart failure, unspecified     mild    Edema     Hypercholesterolemia     Hypertension     Unspecified vitamin D deficiency        Past Surgical History:   Procedure Laterality Date    HX ANGIOPLASTY      HX KNEE ARTHROSCOPY Right     HX PACEMAKER  10/2017    HX TONSILLECTOMY         History   Smoking Status    Former Smoker   Smokeless Tobacco    Never Used       Social History     Social History    Marital status:      Spouse name: N/A    Number of children: N/A    Years of education: N/A     Social History Main Topics    Smoking status: Former Smoker    Smokeless tobacco: Never Used    Alcohol use No    Drug use: None    Sexual activity: Not Asked     Other Topics Concern     Service Yes    Blood Transfusions No    Caffeine Concern No    Occupational Exposure No    Hobby Hazards Yes     fishing    Sleep Concern No    Stress Concern No    Weight Concern Yes     over weight    Special Diet No    Back Care Yes     pain    Exercise Yes    Bike Helmet Yes    Seat Belt Yes    Self-Exams Yes     Social History Narrative       Family History   Problem Relation Age of Onset    Lung Disease Mother     Cancer Mother      Lung--smoker    Heart Disease Father     Cancer Father      Prostate    No Known Problems Sister          Objective:     Visit Vitals    /68 (BP 1 Location: Right arm, BP Patient Position: Sitting)    Pulse 91    Temp 97.5 °F (36.4 °C) (Temporal)    Resp 16    Ht 6' (1.829 m)    Wt 241 lb 3.2 oz (109.4 kg)    SpO2 97%    BMI 32.71 kg/m2     Body mass index is 32.71 kg/(m^2). General: Alert and oriented. No acute distress. Well nourished  HEENT :  Eyes: pupils equal, round, react to light and accommodation. Nose: patent. Mouth and throat is clear. Neck:supple full range of motion no thyromegaly.  Trachea midline, No carotid bruits. No significant lymphadenopathy  Lungs[de-identified] clear to auscultation without wheezes, rales, or rhonchi. Heart :RRR, S1 & S2 are normal intensity. No murmur; no gallop  Extremities: without clubbing, cyanosis, or edema  Pulses: radial and femoral pulses are normal  Neuro: HMF intact. Cranial nerves II through XII grossly normal.      Results for orders placed or performed in visit on 07/06/18   AMB POC PT/INR   Result Value Ref Range    VALID INTERNAL CONTROL POC Yes     Prothrombin time (POC) 20.4 (A) 10.4 - 14 seconds    INR POC 1.7 (A) 2 - 3       Results for orders placed or performed in visit on 07/06/18   AMB POC PT/INR   Result Value Ref Range    VALID INTERNAL CONTROL POC Yes     Prothrombin time (POC) 20.4 (A) 10.4 - 14 seconds    INR POC 1.7 (A) 2 - 3       Assessment/ Plan:     1. Chronic atrial fibrillation (HCC)  Refused to adjust  coumadin dosage to bring PT and INR to therapeutic range. Risks involved  explained . Still refused. - COLLECTION CAPILLARY BLOOD SPECIMEN  - AMB POC PT/INR      Orders Placed This Encounter    COLLECTION CAPILLARY BLOOD SPECIMEN    AMB POC PT/INR    warfarin (COUMADIN) 5 mg tablet     Sig: Take 1 Tab by mouth daily. Take 1 daily     Dispense:  90 Tab     Refill:  3         Verbal and written instructions (see AVS) provided.  Patient expresses understanding of diagnosis and treatment plan. Health Maintenance Due   Topic Date Due    GLAUCOMA SCREENING Q2Y  04/04/2012    FOBT Q 1 YEAR AGE 50-75  01/29/2017         Follow-up Disposition:  Return in about 1 month (around 8/6/2018). Recommended 1 week follow up with adjustment of coumadin dosage. patient refused provided risk of blood clots which can lead to DVT,  PE.,CVA, and MI    IVAN Orozco

## 2018-07-06 NOTE — MR AVS SNAPSHOT
[address] 
 
 
 [address] 
[phone] Patient: Jessica Winters MRN: [id number] NZA:[birth year] Visit Information Date & Time Provider Department Dept. Phone Encounter #  
 7/6/2018  7:30 AM HARRIET [facility] 13[address] [facility] [phone] [id number] Your Appointments 8/17/2018  7:30 AM  
ESTABLISHED PATIENT with Devora Taylor NP  
[address] [place] ([address]) Appt Note: PT  
 [address] [address]  
30[address] [facility] [address] [address] Upcoming Health Maintenance Date Due  
 GLAUCOMA SCREENING Q2Y 4/4/2012 FOBT Q 1 YEAR AGE 50-75 1/29/2017 Influenza Age 5 to Adult 8/1/2018 MEDICARE YEARLY EXAM 2/10/2019 DTaP/Tdap/Td series (2 - Td) 6/26/2027 Allergies as of 7/6/2018  Review Complete On: 7/6/2018 By: Patrice Aguila LPN Severity Noted Reaction Type Reactions Tape [Adhesive] High 11/07/2017    Rash, Angioedema Morphine  05/24/2013    Unknown (comments) Current Immunizations  Never Reviewed Name Date Td, Adsorbed PF 6/26/2017 Not reviewed this visit You Were Diagnosed With   
  
 Codes Comments Chronic atrial fibrillation (HCC)    -  Primary ICD-10-CM: X99.6 ICD-9-CM: 427.31 Vitals BP Pulse Temp Resp Height(growth percentile) 120/68 (BP 1 Location: Right arm, BP Patient Position: Sitting) 91 97.5 °F (36.4 °C) (Temporal) 16 6' (1.829 m) Weight(growth percentile) SpO2 BMI Smoking Status 241 lb 3.2 oz (109.4 kg) 97% 32.71 kg/m2 Former Smoker BMI and BSA Data Body Mass Index Body Surface Area 32.71 kg/m 2 2.36 m 2 Preferred Pharmacy Pharmacy Name Phone [address] - [address] [place] [address] [address] [place] [address] [phone] Your Updated Medication List  
  
   
 This list is accurate as of 7/6/18  7:46 AM.  Always use your most recent med list.  
  
  
  
  
 aspirin 81 mg tablet Take 81 mg by mouth. atorvastatin 80 mg tablet Commonly known as:  LIPITOR Take 1 Tab by mouth nightly. fosinopril 20 mg tablet Commonly known as:  MONOPRIL Take 20 mg by mouth daily. ibuprofen 800 mg tablet Commonly known as:  MOTRIN  
TAKE 1 TABLET TWICE A DAY  Indications: OSTEOARTHRITIS, Pain LIDODERM 5 % Generic drug:  lidocaine  
by TransDERmal route every twenty-four (24) hours. Apply patch to the affected area for 12 hours a day and remove for 12 hours a day. MSM PO Take  by mouth.  
  
 multivitamin tablet Commonly known as:  ONE A DAY Take 1 tablet by mouth daily. warfarin 5 mg tablet Commonly known as:  COUMADIN Take 1 Tab by mouth daily. Take 1 daily Prescriptions Sent to Pharmacy Refills  
 warfarin (COUMADIN) 5 mg tablet 3 Sig: Take 1 Tab by mouth daily. Take 1 daily Class: Normal  
 Pharmacy: [address] [place] [address], [address] [place] [address] Ph #: [phone] Route: Oral  
  
We Performed the Following AMB POC PT/INR [39383 CPT(R)] COLLECTION CAPILLARY BLOOD SPECIMEN [76348 CPT(R)] Introducing \[facility] [place] [facility]\"" & HEALTH SERVICES! Dear Corey Lassiter: Thank you for requesting a DipJar account. Our records indicate that you already have an active DipJar account. You can access your account anytime at [email or web address] Did you know that you can access your hospital and ER discharge instructions at any time in DipJar? You can also review all of your test results from your hospital stay or ER visit. Additional Information If you have questions, please visit the Frequently Asked Questions section of the DipJar website at [email or web address] Remember, DipJar is NOT to be used for urgent needs. For medical emergencies, dial 911. Now available from your iPhone and Android! Please provide this summary of care documentation to your next provider. Your primary care clinician is listed as Patrice Solomon. If you have any questions after today's visit, please call [phone].

## 2018-07-06 NOTE — PROGRESS NOTES
1. Have you been to the ER, urgent care clinic since your last visit? Hospitalized since your last visit? No    2. Have you seen or consulted any other health care providers outside of the [address] [place] [address] since your last visit? Include any pap smears or colon screening. No       Follow up for anticoagulation. Indication: Atrial fibrillation  Current medication:  aspirin  Current symptoms: none  Current control agent: none  History of bleeding problems: No5 mg 1.5 tablets Saturday 5 mg 1 tablet Sunday Tuesday Thursday None All Other Days.   Results for orders placed or performed in visit on 07/06/18   AMB POC PT/INR   Result Value Ref Range    VALID INTERNAL CONTROL POC Yes     Prothrombin time (POC) 20.4 (A) 10.4 - 14 seconds    INR POC 1.7 (A) 2 - 3

## 2018-08-17 ENCOUNTER — OFFICE VISIT (OUTPATIENT)
Dept: FAMILY MEDICINE CLINIC | Age: 73
End: 2018-08-17

## 2018-08-17 VITALS
RESPIRATION RATE: 14 BRPM | DIASTOLIC BLOOD PRESSURE: 76 MMHG | BODY MASS INDEX: 31.83 KG/M2 | TEMPERATURE: 97.7 F | OXYGEN SATURATION: 95 % | SYSTOLIC BLOOD PRESSURE: 124 MMHG | WEIGHT: 240.2 LBS | HEART RATE: 70 BPM | HEIGHT: 73 IN

## 2018-08-17 DIAGNOSIS — Z51.81 ANTICOAGULATION GOAL OF INR 2 TO 3: ICD-10-CM

## 2018-08-17 DIAGNOSIS — Z79.01 ANTICOAGULATION GOAL OF INR 2 TO 3: ICD-10-CM

## 2018-08-17 LAB
INR BLD: 2
PT POC: 24.3 SECONDS
VALID INTERNAL CONTROL?: YES

## 2018-08-17 NOTE — PROGRESS NOTES
1. Have you been to the ER, urgent care clinic since your last visit? Hospitalized since your last visit? No    2. Have you seen or consulted any other health care providers outside of the [facility] since your last visit? Include any pap smears or colon screening. Yes, Optometrist, Dr. Matthew Mariscal for glaucoma screening about 6 months ago.

## 2018-08-17 NOTE — ACP (ADVANCE CARE PLANNING)
Discussed importance of advanced medical directives with patient. Patient is capable of making decisions.   Jeanette Murrieta NP-C

## 2018-08-17 NOTE — MR AVS SNAPSHOT
[address] [facility] [address] 
 
 
 [address] 
[phone] Patient: Jose Coe MRN: [id number] FHO:[birth year] Visit Information Date & Time Provider Department Dept. Phone Encounter #  
 8/17/2018  7:30 AM Francheska Vance NP [facility] 1340 [facility] [phone] [id number] Follow-up Instructions Return in about 1 month (around 9/17/2018). Your Appointments 9/21/2018  7:30 AM  
ESTABLISHED PATIENT with Francheska Vance NP  
[address] [place] ([address]) Appt Note: PT  
 [address] [address]  
30[address] [facility] [address] [address] Upcoming Health Maintenance Date Due  
 GLAUCOMA SCREENING Q2Y 4/4/2012 FOBT Q 1 YEAR AGE 50-75 1/29/2017 Influenza Age 5 to Adult 8/1/2018 MEDICARE YEARLY EXAM 2/10/2019 DTaP/Tdap/Td series (2 - Td) 6/26/2027 Allergies as of 8/17/2018  Review Complete On: 8/17/2018 By: Beatriz Riojas LPN Severity Noted Reaction Type Reactions Tape [Adhesive] High 11/07/2017    Rash, Angioedema Morphine  05/24/2013    Unknown (comments) Current Immunizations  Never Reviewed Name Date Td, Adsorbed PF 6/26/2017 Not reviewed this visit You Were Diagnosed With   
  
 Codes Comments BMI 32.0-32.9,adult    -  Primary ICD-10-CM: O89.13 
ICD-9-CM: V85.32 Anticoagulation goal of INR 2 to 3     ICD-10-CM: Z51.81, Z79.01 
ICD-9-CM: V58.83, V58.61 Vitals BP Pulse Temp Resp Height(growth percentile) Weight(growth percentile) 124/76 (BP 1 Location: Left arm, BP Patient Position: Sitting) 70 97.7 °F (36.5 °C) (Oral) 14 6' 1\" (1.854 m) 240 lb 3.2 oz (109 kg) SpO2 BMI Smoking Status 95% 31.69 kg/m2 Former Smoker BMI and BSA Data  Body Mass Index Body Surface Area  
 31.69 kg/m 2 2.37 m 2  
  
  
 Preferred Pharmacy Pharmacy Name Phone [facility] [address] [place] [address] - [address] [phone] Your Updated Medication List  
  
   
This list is accurate as of 8/17/18  7:52 AM.  Always use your most recent med list.  
  
  
  
  
 aspirin 81 mg tablet Take 81 mg by mouth. atorvastatin 80 mg tablet Commonly known as:  LIPITOR Take 1 Tab by mouth nightly. fosinopril 20 mg tablet Commonly known as:  MONOPRIL Take 20 mg by mouth daily. ibuprofen 800 mg tablet Commonly known as:  MOTRIN  
TAKE 1 TABLET TWICE A DAY  Indications: OSTEOARTHRITIS, Pain LIDODERM 5 % Generic drug:  lidocaine  
by TransDERmal route every twenty-four (24) hours. Apply patch to the affected area for 12 hours a day and remove for 12 hours a day. MSM PO Take  by mouth.  
  
 multivitamin tablet Commonly known as:  ONE A DAY Take 1 tablet by mouth daily. warfarin 5 mg tablet Commonly known as:  COUMADIN Take 1 Tab by mouth daily. Take 1 daily We Performed the Following AMB POC PT/INR [58993 CPT(R)] Follow-up Instructions Return in about 1 month (around 9/17/2018). Introducing [facility] SERVICES! Dear Chris Edwards: Thank you for requesting a [email or web address] account. Our records indicate that you already have an active [email or web address] account. You can access your account anytime at [email or web address] Did you know that you can access your hospital and ER discharge instructions at any time in [email or web address]? You can also review all of your test results from your hospital stay or ER visit. Additional Information If you have questions, please visit the Frequently Asked Questions section of the [email or web address] website at [email or web address] Remember, [email or web address] is NOT to be used for urgent needs. For medical emergencies, dial 911. Now available from your iPhone and Android! Please provide this summary of care documentation to your next provider. Your primary care clinician is listed as Irlanda Smith. If you have any questions after today's visit, please call [phone].

## 2018-08-17 NOTE — PROGRESS NOTES
Subjective:     Naomy Hunter is a 72 y.o. male who presents today with the following:  Chief Complaint   Patient presents with    Anticoagulation     INR check   Enjoys fishing. From [place]. Patient Active Problem List   Diagnosis Code    CAD (coronary artery disease) I25.10    Atrial fibrillation (HCC) I48.91    Hyperlipidemia E78.5    Hypertension I10    Congestive heart failure (HCC) I50.9    Current use of long term anticoagulation Z79.01         COMPLIANT WITH MEDICATION:    Denies chest pain, dyspnea, palpitations, headache and blurred vision. Blood pressure normotensive. Anticoagulation. Dx:  Atrial fibrillation. Current symptoms: none  Current control agent: none  Current anticoagulant: warfarin5 mg daily  History of bleeding problems: none  Lab Results   Component Value Date/Time    INR 1.5 (H) 10/05/2017 08:26 AM    INR POC 2.0 08/17/2018 07:32 AM    INR POC 1.7 (A) 07/06/2018 07:18 AM    INR POC 2.9 05/25/2018 07:29 AM    Prothrombin time 15.4 (H) 10/05/2017 08:26 AM         ROS:  Gen: denies fever, chills, fatigue, weight loss, weight gain  HEENT:denies blurry vision, nasal congestion, sore throat  Resp: denies dypsnea, cough, wheezing  CV: denies chest pain radiating to the jaws or arms, palpitations, lower extremity edema  Abd: denies nausea, vomiting, diarrhea, constipation  Neuro: denies numbness/tingling  Endo: denies polyuria, polydipsia, heat/cold intolerance  Heme: no lymphadenopathy    Allergies   Allergen Reactions    Tape [Adhesive] Rash and Angioedema    Morphine Unknown (comments)         Current Outpatient Prescriptions:     warfarin (COUMADIN) 5 mg tablet, Take 1 Tab by mouth daily.  Take 1 daily, Disp: 90 Tab, Rfl: 3    ibuprofen (MOTRIN) 800 mg tablet, TAKE 1 TABLET TWICE A DAY  Indications: OSTEOARTHRITIS, Pain, Disp: 180 Tab, Rfl: 1    atorvastatin (LIPITOR) 80 mg tablet, Take 1 Tab by mouth nightly., Disp: 90 Tab, Rfl: 4    multivitamin (ONE A DAY) tablet, Take 1 tablet by mouth daily. , Disp: , Rfl:     lidocaine (LIDODERM) 5 %(700 mg/patch), by TransDERmal route every twenty-four (24) hours. Apply patch to the affected area for 12 hours a day and remove for 12 hours a day., Disp: , Rfl:     aspirin 81 mg tablet, Take 81 mg by mouth., Disp: , Rfl:     fosinopril (MONOPRIL) 20 mg tablet, Take 20 mg by mouth daily. , Disp: , Rfl:     METHYLSULFONYLMETHANE (MSM PO), Take  by mouth., Disp: , Rfl:     Past Medical History:   Diagnosis Date    Acute MI ([facility] [address]     Arthritis     Atrial fibrillation ([facility] [address]     Congestive heart failure, unspecified     mild    Edema     Hypercholesterolemia     Hypertension     Unspecified vitamin D deficiency        Past Surgical History:   Procedure Laterality Date    HX ANGIOPLASTY      HX KNEE ARTHROSCOPY Right     HX PACEMAKER  10/2017    HX TONSILLECTOMY         History   Smoking Status    Former Smoker   Smokeless Tobacco    Never Used       Social History     Social History    Marital status:      Spouse name: N/A    Number of children: N/A    Years of education: N/A     Social History Main Topics    Smoking status: Former Smoker    Smokeless tobacco: Never Used    Alcohol use No    Drug use: None    Sexual activity: Not Asked     Other Topics Concern     Service Yes    Blood Transfusions No    Caffeine Concern No    Occupational Exposure No    Hobby Hazards Yes     fishing    Sleep Concern No    Stress Concern No    Weight Concern Yes     over weight    Special Diet No    Back Care Yes     pain    Exercise Yes    Bike Helmet Yes    Seat Belt Yes    Self-Exams Yes     Social History Narrative       Family History   Problem Relation Age of Onset    Lung Disease Mother     Cancer Mother      Lung--smoker    Heart Disease Father     Cancer Father      Prostate    No Known Problems Sister          Objective:     Visit Vitals    /76 (BP 1 Location: Left arm, BP Patient Position: Sitting)    Pulse 70    Temp 97.7 °F (36.5 °C) (Oral)    Resp 14    Ht 6' 1\" (1.854 m)    Wt 240 lb 3.2 oz (109 kg)    SpO2 95%    BMI 31.69 kg/m2     Body mass index is 31.69 kg/(m^2). General: Alert and oriented. No acute distress. Well nourished  HEENT :  Ears:TMs are normal. Canals are clear. Eyes: pupils equal, round, react to light and accommodation. Extra ocular movements intact. Nose: patent. Mouth and throat is clear. Neck:supple full range of motion no thyromegaly. Trachea midline, No carotid bruits. No significant lymphadenopathy  Lungs[de-identified] clear to auscultation without wheezes, rales, or rhonchi. Heart :Irreguarly regular, S1 & S2 are normal intensity. No murmur; no gallop,   Abdomen: bowel sounds active. No tenderness, guarding, rebound, masses, hepatic or spleen enlargement  Extremities: without clubbing, cyanosis, or edema  Pulses: radial and femoral pulses are normal  Neuro: HMF intact. Cranial nerves II through XII grossly normal.  Skin: scar from pacemaker incision healed         Results for orders placed or performed in visit on 08/17/18   AMB POC PT/INR   Result Value Ref Range    VALID INTERNAL CONTROL POC Yes     Prothrombin time (POC) 24.3 seconds    INR POC 2.0        Results for orders placed or performed in visit on 08/17/18   AMB POC PT/INR   Result Value Ref Range    VALID INTERNAL CONTROL POC Yes     Prothrombin time (POC) 24.3 seconds    INR POC 2.0        Assessment/ Plan:     1. BMI 32.0-32.9,adult  Discussed the patient's BMI with him. The BMI follow up plan is as follows:     dietary management education, guidance, and counseling  encourage exercise  monitor weight  prescribed dietary intake    An After Visit Summary was printed and given to the patient.     2. Anticoagulation goal of INR 2 to 3    - AMB POC PT/INR      Orders Placed This Encounter    AMB POC PT/INR         Verbal and written instructions (see AVS) provided.  Patient expresses understanding of diagnosis and treatment plan. Health Maintenance Due   Topic Date Due    GLAUCOMA SCREENING Q2Y  04/04/2012    FOBT Q 1 YEAR AGE 50-75  01/29/2017    Influenza Age 5 to Adult  08/01/2018         Follow-up Disposition:  Return in about 1 month (around 9/17/2018). or sooner as needed.       Jasmin Beltran, MIHAELAP-C

## 2018-10-25 PROBLEM — M17.12 PRIMARY LOCALIZED OSTEOARTHRITIS OF LEFT KNEE: Status: ACTIVE | Noted: 2017-12-01

## 2018-10-25 PROBLEM — E55.9 UNSPECIFIED VITAMIN D DEFICIENCY: Status: ACTIVE | Noted: 2018-10-25

## 2018-10-25 PROBLEM — M17.11 PRIMARY LOCALIZED OSTEOARTHROSIS OF THE KNEE, RIGHT: Status: ACTIVE | Noted: 2017-12-01

## 2021-10-08 ENCOUNTER — TRANSCRIBE ORDER (OUTPATIENT)
Dept: SCHEDULING | Age: 76
End: 2021-10-08

## 2021-10-08 DIAGNOSIS — M54.16 LUMBAR RADICULOPATHY: Primary | ICD-10-CM

## 2021-10-13 ENCOUNTER — HOSPITAL ENCOUNTER (OUTPATIENT)
Dept: CT IMAGING | Age: 76
Discharge: HOME | End: 2021-10-13
Payer: MEDICARE

## 2021-10-13 DIAGNOSIS — M54.16 LUMBAR RADICULOPATHY: ICD-10-CM

## 2021-10-13 PROCEDURE — 72131 CT LUMBAR SPINE W/O DYE: CPT

## 2022-02-16 RX ORDER — IBUPROFEN 800 MG/1
TABLET ORAL
Qty: 180 TABLET | Refills: 3 | Status: CANCELLED | OUTPATIENT
Start: 2022-02-16

## 2022-03-18 PROBLEM — M17.12 PRIMARY LOCALIZED OSTEOARTHRITIS OF LEFT KNEE: Status: ACTIVE | Noted: 2017-12-01

## 2022-03-19 PROBLEM — M17.11 PRIMARY LOCALIZED OSTEOARTHROSIS OF THE KNEE, RIGHT: Status: ACTIVE | Noted: 2017-12-01

## 2022-03-19 PROBLEM — E55.9 VITAMIN D INSUFFICIENCY: Status: ACTIVE | Noted: 2018-10-25

## 2023-05-20 RX ORDER — NITROGLYCERIN 80 MG/1
PATCH TRANSDERMAL
COMMUNITY
Start: 2008-09-19

## 2023-05-20 RX ORDER — LIDOCAINE 50 MG/G
PATCH TOPICAL
COMMUNITY
Start: 2019-04-05

## 2023-05-20 RX ORDER — OXYCODONE HYDROCHLORIDE 5 MG/1
TABLET ORAL
COMMUNITY
Start: 2020-03-18

## 2023-05-20 RX ORDER — IBUPROFEN 800 MG/1
TABLET ORAL
COMMUNITY
Start: 2020-12-15

## 2023-05-20 RX ORDER — FOSINOPRIL SODIUM 20 MG/1
1 TABLET ORAL DAILY
COMMUNITY
Start: 2020-12-15

## 2023-05-20 RX ORDER — FAMOTIDINE 20 MG/1
1 TABLET, FILM COATED ORAL 2 TIMES DAILY
COMMUNITY
Start: 2020-03-18

## 2023-05-20 RX ORDER — CLOTRIMAZOLE 1 %
CREAM (GRAM) TOPICAL 2 TIMES DAILY
COMMUNITY
Start: 2020-02-04

## 2023-05-20 RX ORDER — NYSTATIN 100000 [USP'U]/G
POWDER TOPICAL 4 TIMES DAILY
COMMUNITY
Start: 2020-12-02

## 2023-05-20 RX ORDER — WARFARIN SODIUM 5 MG/1
TABLET ORAL
COMMUNITY
Start: 2020-12-17

## 2023-05-20 RX ORDER — ATORVASTATIN CALCIUM 80 MG/1
1 TABLET, FILM COATED ORAL NIGHTLY
COMMUNITY
Start: 2020-04-22

## 2023-05-20 RX ORDER — TAMSULOSIN HYDROCHLORIDE 0.4 MG/1
1 CAPSULE ORAL DAILY
COMMUNITY
Start: 2020-12-21

## 2023-05-20 RX ORDER — TRAMADOL HYDROCHLORIDE 50 MG/1
50 TABLET ORAL 2 TIMES DAILY
COMMUNITY
Start: 2020-03-20